# Patient Record
Sex: FEMALE | Race: WHITE
[De-identification: names, ages, dates, MRNs, and addresses within clinical notes are randomized per-mention and may not be internally consistent; named-entity substitution may affect disease eponyms.]

---

## 2017-11-17 ENCOUNTER — HOSPITAL ENCOUNTER (OUTPATIENT)
Dept: HOSPITAL 53 - M LAB REF | Age: 22
End: 2017-11-17
Attending: PHYSICIAN ASSISTANT
Payer: COMMERCIAL

## 2017-11-17 DIAGNOSIS — Z20.2: Primary | ICD-10-CM

## 2017-11-30 ENCOUNTER — HOSPITAL ENCOUNTER (OUTPATIENT)
Dept: HOSPITAL 53 - M LAB | Age: 22
End: 2017-11-30
Attending: ADVANCED PRACTICE MIDWIFE
Payer: COMMERCIAL

## 2017-11-30 DIAGNOSIS — O20.0: Primary | ICD-10-CM

## 2018-03-20 ENCOUNTER — HOSPITAL ENCOUNTER (EMERGENCY)
Dept: HOSPITAL 53 - M ED | Age: 23
Discharge: HOME | End: 2018-03-20
Payer: COMMERCIAL

## 2018-03-20 ENCOUNTER — HOSPITAL ENCOUNTER (OUTPATIENT)
Dept: HOSPITAL 53 - M LAB REF | Age: 23
End: 2018-03-20
Attending: PHYSICIAN ASSISTANT
Payer: COMMERCIAL

## 2018-03-20 DIAGNOSIS — O99.89: Primary | ICD-10-CM

## 2018-03-20 DIAGNOSIS — Z3A.21: ICD-10-CM

## 2018-03-20 DIAGNOSIS — R35.0: Primary | ICD-10-CM

## 2018-03-20 DIAGNOSIS — R51: ICD-10-CM

## 2018-03-20 DIAGNOSIS — E16.2: ICD-10-CM

## 2018-03-20 LAB
ALBUMIN/GLOBULIN RATIO: 1 (ref 1–1.93)
ALBUMIN: 3.5 GM/DL (ref 3.2–5.2)
ALKALINE PHOSPHATASE: 74 U/L (ref 45–117)
ALT SERPL W P-5'-P-CCNC: 14 U/L (ref 12–78)
ANION GAP: 7 MEQ/L (ref 8–16)
AST SERPL-CCNC: 12 U/L (ref 7–37)
BASO #: 0 10^3/UL (ref 0–0.2)
BASO %: 0.3 % (ref 0–1)
BILIRUB CONJ SERPL-MCNC: 0.1 MG/DL (ref 0–0.2)
BILIRUBIN,TOTAL: 0.2 MG/DL (ref 0.2–1)
BLOOD UREA NITROGEN: 5 MG/DL (ref 7–18)
CALCIUM LEVEL: 8.5 MG/DL (ref 8.5–10.1)
CARBON DIOXIDE LEVEL: 25 MEQ/L (ref 21–32)
CHLORIDE LEVEL: 107 MEQ/L (ref 98–107)
CREATININE FOR GFR: 0.5 MG/DL (ref 0.55–1.3)
EOS #: 0.3 10^3/UL (ref 0–0.5)
EOSINOPHIL NFR BLD AUTO: 2.9 % (ref 0–3)
FREE T4: 1.03 NG/DL (ref 0.78–1.33)
GLUCOSE, FASTING: 68 MG/DL (ref 70–100)
HEMATOCRIT: 40.4 % (ref 36–47)
HEMOGLOBIN: 13.9 G/DL (ref 12–16)
IMMATURE GRANULOCYTE %: 0.4 % (ref 0–3)
LEUKOCYTE ESTERASE UR AUTO RFX: (no result)
LYMPH #: 2 10^3/UL (ref 1.5–6.5)
LYMPH %: 17.1 % (ref 24–44)
MAGNESIUM LEVEL: 2.2 MG/DL (ref 1.8–2.4)
MEAN CORPUSCULAR HEMOGLOBIN: 29 PG (ref 27–33)
MEAN CORPUSCULAR HGB CONC: 34.4 G/DL (ref 32–36.5)
MEAN CORPUSCULAR VOLUME: 84.3 FL (ref 80–96)
MONO #: 0.7 10^3/UL (ref 0–0.8)
MONO %: 6.4 % (ref 0–5)
NEUTROPHILS #: 8.4 10^3/UL (ref 1.8–7.7)
NEUTROPHILS %: 72.9 % (ref 36–66)
NRBC BLD AUTO-RTO: 0 % (ref 0–0)
PLATELET COUNT, AUTOMATED: 245 10^3/UL (ref 150–450)
POTASSIUM SERUM: 4.1 MEQ/L (ref 3.5–5.1)
RED BLOOD COUNT: 4.79 10^6/UL (ref 4–5.4)
RED CELL DISTRIBUTION WIDTH: 14.2 % (ref 11.5–14.5)
SODIUM LEVEL: 139 MEQ/L (ref 136–145)
SPECIFIC GRAVITY UR AUTO RFX: 1.02 (ref 1–1.03)
SQUAM EPITHELIAL CELL UR AURFX: 4 /HPF (ref 0–6)
THYROID STIMULATING HORMONE: 0.53 UIU/ML (ref 0.46–3.98)
TOTAL PROTEIN: 7 GM/DL (ref 6.4–8.2)
WHITE BLOOD COUNT: 11.5 10^3/UL (ref 4–10)

## 2018-03-20 RX ADMIN — ACETAMINOPHEN 1 MG: 325 TABLET ORAL at 17:32

## 2018-03-20 RX ADMIN — SODIUM CHLORIDE 1 MLS/HR: 9 INJECTION, SOLUTION INTRAVENOUS at 17:32

## 2018-03-20 RX ADMIN — METOCLOPRAMIDE 1 MG: 5 INJECTION, SOLUTION INTRAMUSCULAR; INTRAVENOUS at 17:32

## 2019-07-26 ENCOUNTER — HOSPITAL ENCOUNTER (OUTPATIENT)
Dept: HOSPITAL 53 - M LAB REF | Age: 24
End: 2019-07-26
Attending: ADVANCED PRACTICE MIDWIFE
Payer: COMMERCIAL

## 2019-07-26 DIAGNOSIS — Z32.01: Primary | ICD-10-CM

## 2019-07-26 LAB
B-HCG SERPL-ACNC: (no result) MIU/ML
HCT VFR BLD AUTO: 42 % (ref 36–47)
HCV AB SER QL: 0.2 INDEX (ref ?–0.8)
HGB BLD-MCNC: 14.4 G/DL (ref 12–15.5)
HIV 1+2 AB+HIV1 P24 AG SERPL QL IA: NEGATIVE
MCH RBC QN AUTO: 29.7 PG (ref 27–33)
MCHC RBC AUTO-ENTMCNC: 34.3 G/DL (ref 32–36.5)
MCV RBC AUTO: 86.6 FL (ref 80–96)
PLATELET # BLD AUTO: 266 10^3/UL (ref 150–450)
RBC # BLD AUTO: 4.85 10^6/UL (ref 4–5.4)
RUBELLA IGG QUALITATIVE: (no result)
WBC # BLD AUTO: 8.8 10^3/UL (ref 4–10)

## 2019-11-04 ENCOUNTER — HOSPITAL ENCOUNTER (EMERGENCY)
Dept: HOSPITAL 19 - COL.ER | Age: 24
Discharge: HOME | End: 2019-11-04
Payer: OTHER GOVERNMENT

## 2019-11-04 VITALS — SYSTOLIC BLOOD PRESSURE: 104 MMHG | DIASTOLIC BLOOD PRESSURE: 60 MMHG | HEART RATE: 68 BPM

## 2019-11-04 VITALS — TEMPERATURE: 98.2 F

## 2019-11-04 VITALS — HEIGHT: 64.02 IN | BODY MASS INDEX: 22.24 KG/M2 | WEIGHT: 130.29 LBS

## 2019-11-04 DIAGNOSIS — Z3A.21: ICD-10-CM

## 2019-11-04 DIAGNOSIS — B96.89: ICD-10-CM

## 2019-11-04 DIAGNOSIS — O23.592: Primary | ICD-10-CM

## 2019-11-04 LAB
PH UR STRIP.AUTO: 7 [PH] (ref 5–8)
RBC # UR: (no result) /HPF
SP GR UR STRIP.AUTO: 1.01 (ref 1–1.03)
SQUAMOUS # URNS: (no result) /HPF
URN COLLECT METHOD CLASS: (no result)

## 2020-01-23 ENCOUNTER — HOSPITAL ENCOUNTER (OUTPATIENT)
Dept: HOSPITAL 19 - LDRO | Age: 25
Discharge: HOME | End: 2020-01-23
Attending: OBSTETRICS & GYNECOLOGY
Payer: OTHER GOVERNMENT

## 2020-01-23 VITALS — WEIGHT: 156.31 LBS | BODY MASS INDEX: 26.69 KG/M2 | HEIGHT: 64.02 IN

## 2020-01-23 VITALS — SYSTOLIC BLOOD PRESSURE: 114 MMHG | DIASTOLIC BLOOD PRESSURE: 66 MMHG | TEMPERATURE: 97.5 F | HEART RATE: 96 BPM

## 2020-01-23 DIAGNOSIS — Z3A.34: ICD-10-CM

## 2020-01-23 DIAGNOSIS — O36.8130: Primary | ICD-10-CM

## 2020-01-23 NOTE — NUR
Pt and spouse arrive ambulatory to unit at 1630.  Pt states she has not felt
baby move in 2.5-3 hours, denies leaking of fluid, contractions, vaginal
bleeding.  States she is having some cramping.  EFM explained and placed.  Pt
positioned for comfort.

## 2020-01-23 NOTE — NUR
1655 - Notified Dr. Pate, see physician notification.
 
1710 - Pt discharged with discomforts of pregnancy handouts and instructions
to return if vaginal bleeding, contractions, leaking of fluid, or decreased
fetal movement.  Pt stated understaning, ambulated independently off unit with
spouse.

## 2020-03-08 ENCOUNTER — HOSPITAL ENCOUNTER (INPATIENT)
Dept: HOSPITAL 19 - LDRO | Age: 25
LOS: 2 days | Discharge: HOME | End: 2020-03-10
Attending: OBSTETRICS & GYNECOLOGY | Admitting: OBSTETRICS & GYNECOLOGY
Payer: OTHER GOVERNMENT

## 2020-03-08 VITALS — HEART RATE: 102 BPM | DIASTOLIC BLOOD PRESSURE: 81 MMHG | SYSTOLIC BLOOD PRESSURE: 132 MMHG | TEMPERATURE: 98.7 F

## 2020-03-08 VITALS — DIASTOLIC BLOOD PRESSURE: 56 MMHG | TEMPERATURE: 98.9 F | SYSTOLIC BLOOD PRESSURE: 99 MMHG | HEART RATE: 67 BPM

## 2020-03-08 VITALS — SYSTOLIC BLOOD PRESSURE: 131 MMHG | DIASTOLIC BLOOD PRESSURE: 85 MMHG | HEART RATE: 95 BPM

## 2020-03-08 VITALS — HEART RATE: 98 BPM | DIASTOLIC BLOOD PRESSURE: 66 MMHG | SYSTOLIC BLOOD PRESSURE: 116 MMHG | TEMPERATURE: 98.7 F

## 2020-03-08 VITALS — HEART RATE: 92 BPM | DIASTOLIC BLOOD PRESSURE: 76 MMHG | SYSTOLIC BLOOD PRESSURE: 132 MMHG

## 2020-03-08 VITALS — HEART RATE: 105 BPM | DIASTOLIC BLOOD PRESSURE: 56 MMHG | SYSTOLIC BLOOD PRESSURE: 123 MMHG

## 2020-03-08 VITALS — TEMPERATURE: 98.2 F | HEART RATE: 112 BPM | SYSTOLIC BLOOD PRESSURE: 113 MMHG | DIASTOLIC BLOOD PRESSURE: 74 MMHG

## 2020-03-08 VITALS — HEART RATE: 84 BPM | DIASTOLIC BLOOD PRESSURE: 64 MMHG | SYSTOLIC BLOOD PRESSURE: 120 MMHG

## 2020-03-08 VITALS — HEART RATE: 85 BPM | SYSTOLIC BLOOD PRESSURE: 142 MMHG | DIASTOLIC BLOOD PRESSURE: 75 MMHG

## 2020-03-08 VITALS — DIASTOLIC BLOOD PRESSURE: 61 MMHG | HEART RATE: 71 BPM | SYSTOLIC BLOOD PRESSURE: 111 MMHG

## 2020-03-08 VITALS — SYSTOLIC BLOOD PRESSURE: 113 MMHG | HEART RATE: 85 BPM | DIASTOLIC BLOOD PRESSURE: 66 MMHG

## 2020-03-08 VITALS — DIASTOLIC BLOOD PRESSURE: 59 MMHG | SYSTOLIC BLOOD PRESSURE: 116 MMHG | HEART RATE: 81 BPM

## 2020-03-08 VITALS — DIASTOLIC BLOOD PRESSURE: 70 MMHG | SYSTOLIC BLOOD PRESSURE: 120 MMHG | HEART RATE: 108 BPM

## 2020-03-08 VITALS — DIASTOLIC BLOOD PRESSURE: 66 MMHG | TEMPERATURE: 99.2 F | SYSTOLIC BLOOD PRESSURE: 110 MMHG | HEART RATE: 101 BPM

## 2020-03-08 VITALS — DIASTOLIC BLOOD PRESSURE: 87 MMHG | SYSTOLIC BLOOD PRESSURE: 128 MMHG | HEART RATE: 117 BPM

## 2020-03-08 VITALS — DIASTOLIC BLOOD PRESSURE: 85 MMHG | TEMPERATURE: 98.4 F | HEART RATE: 97 BPM | SYSTOLIC BLOOD PRESSURE: 136 MMHG

## 2020-03-08 VITALS — HEART RATE: 76 BPM | SYSTOLIC BLOOD PRESSURE: 109 MMHG | DIASTOLIC BLOOD PRESSURE: 61 MMHG

## 2020-03-08 VITALS — TEMPERATURE: 97.7 F | HEART RATE: 90 BPM | SYSTOLIC BLOOD PRESSURE: 120 MMHG | DIASTOLIC BLOOD PRESSURE: 78 MMHG

## 2020-03-08 VITALS — DIASTOLIC BLOOD PRESSURE: 69 MMHG | SYSTOLIC BLOOD PRESSURE: 104 MMHG | HEART RATE: 125 BPM

## 2020-03-08 VITALS — DIASTOLIC BLOOD PRESSURE: 85 MMHG | HEART RATE: 103 BPM | SYSTOLIC BLOOD PRESSURE: 130 MMHG

## 2020-03-08 VITALS — HEART RATE: 82 BPM | DIASTOLIC BLOOD PRESSURE: 72 MMHG | SYSTOLIC BLOOD PRESSURE: 125 MMHG

## 2020-03-08 VITALS — HEART RATE: 100 BPM | SYSTOLIC BLOOD PRESSURE: 119 MMHG | DIASTOLIC BLOOD PRESSURE: 73 MMHG

## 2020-03-08 VITALS — SYSTOLIC BLOOD PRESSURE: 117 MMHG | HEART RATE: 137 BPM | DIASTOLIC BLOOD PRESSURE: 77 MMHG

## 2020-03-08 VITALS — SYSTOLIC BLOOD PRESSURE: 136 MMHG | HEART RATE: 104 BPM | DIASTOLIC BLOOD PRESSURE: 86 MMHG

## 2020-03-08 VITALS — HEART RATE: 91 BPM | DIASTOLIC BLOOD PRESSURE: 78 MMHG | SYSTOLIC BLOOD PRESSURE: 123 MMHG

## 2020-03-08 VITALS — BODY MASS INDEX: 28.23 KG/M2 | HEIGHT: 64.02 IN | WEIGHT: 165.35 LBS

## 2020-03-08 VITALS — DIASTOLIC BLOOD PRESSURE: 64 MMHG | SYSTOLIC BLOOD PRESSURE: 105 MMHG | HEART RATE: 73 BPM

## 2020-03-08 VITALS — HEART RATE: 93 BPM | SYSTOLIC BLOOD PRESSURE: 134 MMHG | DIASTOLIC BLOOD PRESSURE: 86 MMHG

## 2020-03-08 VITALS — SYSTOLIC BLOOD PRESSURE: 138 MMHG | HEART RATE: 139 BPM | DIASTOLIC BLOOD PRESSURE: 69 MMHG

## 2020-03-08 VITALS — SYSTOLIC BLOOD PRESSURE: 125 MMHG | DIASTOLIC BLOOD PRESSURE: 72 MMHG | HEART RATE: 96 BPM

## 2020-03-08 VITALS — DIASTOLIC BLOOD PRESSURE: 59 MMHG | HEART RATE: 78 BPM | SYSTOLIC BLOOD PRESSURE: 115 MMHG

## 2020-03-08 VITALS — SYSTOLIC BLOOD PRESSURE: 119 MMHG | HEART RATE: 99 BPM | DIASTOLIC BLOOD PRESSURE: 78 MMHG

## 2020-03-08 VITALS — DIASTOLIC BLOOD PRESSURE: 76 MMHG | HEART RATE: 100 BPM | SYSTOLIC BLOOD PRESSURE: 121 MMHG

## 2020-03-08 VITALS — HEART RATE: 97 BPM | SYSTOLIC BLOOD PRESSURE: 118 MMHG | DIASTOLIC BLOOD PRESSURE: 75 MMHG

## 2020-03-08 VITALS — SYSTOLIC BLOOD PRESSURE: 114 MMHG | HEART RATE: 72 BPM | TEMPERATURE: 98 F | DIASTOLIC BLOOD PRESSURE: 60 MMHG

## 2020-03-08 VITALS — SYSTOLIC BLOOD PRESSURE: 122 MMHG | DIASTOLIC BLOOD PRESSURE: 76 MMHG | HEART RATE: 97 BPM

## 2020-03-08 DIAGNOSIS — Z3A.39: ICD-10-CM

## 2020-03-08 LAB
BASOPHILS # BLD: 0.1 10*3/UL (ref 0–0.2)
BASOPHILS NFR BLD AUTO: 0.4 % (ref 0–2)
EOSINOPHIL # BLD: 0.1 10*3/UL (ref 0–0.7)
EOSINOPHIL NFR BLD: 0.4 % (ref 0–4)
ERYTHROCYTE [DISTWIDTH] IN BLOOD BY AUTOMATED COUNT: 14.6 % (ref 11.5–14.5)
GRANULOCYTES # BLD AUTO: 77.6 % (ref 42.2–75.2)
HCT VFR BLD AUTO: 37.9 % (ref 37–47)
HGB BLD-MCNC: 12.3 G/DL (ref 12.5–16)
LYMPHOCYTES # BLD: 1.9 10*3/UL (ref 1.2–3.4)
LYMPHOCYTES NFR BLD: 14.3 % (ref 20–51)
MCH RBC QN AUTO: 26 PG (ref 27–31)
MCHC RBC AUTO-ENTMCNC: 33 G/DL (ref 33–37)
MCV RBC AUTO: 81 FL (ref 80–100)
MONOCYTES # BLD: 0.8 10*3/UL (ref 0.1–0.6)
MONOCYTES NFR BLD AUTO: 6.3 % (ref 1.7–9.3)
NEUTROPHILS # BLD: 10.4 10*3/UL (ref 1.4–6.5)
PLATELET # BLD AUTO: 223 K/MM3 (ref 130–400)
PMV BLD AUTO: 11.5 FL (ref 7.4–10.4)
RBC # BLD AUTO: 4.69 M/MM3 (ref 4.1–5.3)

## 2020-03-08 PROCEDURE — 0KQM0ZZ REPAIR PERINEUM MUSCLE, OPEN APPROACH: ICD-10-PCS | Performed by: OBSTETRICS & GYNECOLOGY

## 2020-03-08 PROCEDURE — 10907ZC DRAINAGE OF AMNIOTIC FLUID, THERAPEUTIC FROM PRODUCTS OF CONCEPTION, VIA NATURAL OR ARTIFICIAL OPENING: ICD-10-PCS | Performed by: OBSTETRICS & GYNECOLOGY

## 2020-03-08 NOTE — NUR
Patient sitting up, epidural catheter removed, blue tip intact and band aid to
site. Patient able to raise both legs off beg but states "left leg and thigh
feel heavy." Patient pivots into wheelchair with assist times 2. Patient
pivots onto toilet with assist times 2. Patient unable to void at this time
but feels the urge. Pericare provided, ice pack and tucks pads used. New gown
on. Patient pivots into wheelchair taken to room 207 and resting in bed.
Educated patient to call out for help to bathroom. Patient verbalizes
understanding.

## 2020-03-08 NOTE — NUR
2022: Spontaneous vaginal delivery of infant head, immediately followed by
infant body. Infant to mothers abdomen where nose and mouth suctioned by Dr. Pate. Pitocin off. Infant umbilical cord clamped by Dr. Pate and cut by
father of baby.
2025: Spontaneous and intact delivery of placenta. Fundus massaged and firms
with massage. Pitocin restarted at 333ml/hr. Second degree perineal laceration
repaired. Uterine atony noted and methergine given IM at 2032. Fundus massaged
and firms with massage. EBL 400ml. Pericare provided, ice pack to perineum and
patient sitting up in bed. Apgars 8/9/9. See labor and delivery summary,
doctor dictation and anesthesia records.

## 2020-03-08 NOTE — NUR
Pt requesting epidural. Pt ambulating in halls at this time. Toño COPPOLA
called and notified. Will be here at 1500.

## 2020-03-08 NOTE — NUR
After Hospital Care Plan:    Discharge Instructions Knee Replacement-Dr. Barber Iyer    Patient Name  Albina Babinski  Date of procedure  8/6/2018    Procedure  Procedure(s):  RIGHT TOTAL KNEE ARTHROPLASTY  (CHOICE ANESTH)  Surgeon  Surgeon(s) and Role:     * Art Dutta MD - Primary  Date of discharge: No discharge date for patient encounter. PCP: Chevy Henry MD    Follow up appointments  -follow up with Dr. Barber Iyer in 2 weeks. Call [phone] to make an appointment. [facility] Agency: _________________________   phone: ___________________  The agency will contact you to arrange dates/times for visits. Please call them if you do not hear from them within 24 hours after you are discharged. When to call your Orthopaedic Surgeon:  -unrelieved pain  -Signs of infection-if your incision is red; continues to have drainage; drainage has a foul odor or if you have a persistent fever over 101 degrees  -Signs of a blood clot in your leg-calf pain, tenderness, redness, swelling of lower leg  When to call your Primary Care Physician:  -Concerns about medical conditions such as diabetes, high blood pressure, asthma, congestive heart failure  -Call if blood sugars are elevated, persistent headache or dizziness, coughing or congestion, constipation or diarrhea, burning with urination, abnormal heart rate  When to call 911and go to the nearest emergency room  -acute onset of chest pain, shortness of breath, difficulty breathing    Activity  -weight bearing as tolerated with your walker or crutches. Refer to pages 23-31 of your handbook for instructions and pictures.   -20 repetitions of each exercise as instructed by therapist 3 times each day.   Refer to pages 32-40 of your handbook for exercise instructions and pictures  -get up every one hour and walk (except at night when sleeping)  -do not drive or operate heavy machinery    Incision Care  -you will have staples in your knee incision; they will be removed at Pt here with c/o contractions that started this morning around 0900 and have
been every 4 minutes.
G2L0 39.1 weeks gestation. Pt to EFM, explained.
VSS, intital fetal baseline 155bpm.
SVE:3/50/-2.
Assessment complete, pt with hx of 21 week TAB for Trisomy 13, GBS negative.
Pt states has had some decreased fetal movement. Denies any vaginal bleeding
or leaking of fluid.
Contractions every 3-4 minutes, breathing through them and palpate firm.
1125:Dr Pate called and updated, will recheck in one hour and call physician
back. the surgeons office visit in 2 weeks  -Keep a dressing (ABD and paper tape) on your incision and change daily. Wash your hands thoroughly before changing the dressing. Once you incision is not draining, you may leave it open to air  -You may shower and get your incision wet but do not submerge your incision under water in a bath tub, hot tub or swimming pool for 6 weeks after surgery. Preventing blood clots  -take Xarelto at 12 noon daily as prescribed by Dr. Angelique Odonnell    Pain management  -take pain medication as prescribed; decrease the amount you use as your pain lessens  -avoid alcoholic beverages while taking pain medication  -Please be aware that many medications contain Tylenol. We do not want you to over medicate so please read the information below as a guide. Do not take more than 4 Grams of Tylenol in a 24 hour period. (There are 1000 milligrams in one Gram)    -You may place an ice bag on your knee for 15-20 minutes after exercising    Diet  -resume usual diet; drink plenty of fluids; eat foods high in fiber  -you may want to take a stool softener (such as Senokot-S or Colace) to prevent constipation while you are taking pain medication. If constipation occurs, take a laxative (such as Dulcolax tablets, Milk of Magnesia, or a suppository)    [facility] Protocol [facility]  Nursing-per physicians order  -complete head to toe assessment, vital signs  -staples will be removed in the surgeons office at 2 week visit  -medication reconciliation  -review pain management  -manage chronic medical conditions    Physical Therapy-per physicians order  Weight bearing status:  Precautions at Admission: WBAT, Fall  Left Side Weight Bearing: Full  Right Side Weight Bearing: As tolerated  ?   Mobility Status:  Supine to Sit: Modified independent, Adaptive equipment, Additional time (using strap to assist RLE)  Sit to Stand: Supervision, Adaptive equipment, Additional time  Sit to Supine: Modified independent, Adaptive equipment, Additional time (using strap to assist RLE)     Gait:  Distance (ft): 120 Feet (ft)  Ambulation - Level of Assistance: Contact guard assistance, Adaptive equipment, Additional time  Assistive Device: Gait belt, Walker, rolling  Gait Abnormalities: Antalgic, Decreased step clearance, Step to gait (partial step through with practice)  ADL status overall composite:                -Home Therapy  -assessment and evaluation-bed mobility; functional transfers (bed, chair, bathroom, stairs); ambulation with equipment, car transfers, shower transfers, safety and ability to get out of house in the event of an emergency  -review weight bearing as tolerated, wean from walker or crutches as tolerated  -discuss pain management  -review how to do ADLs    -Home Exercise Program-refer to [id number] of the patient handbook for exercises  -supine exercises-ankle pumps, hamstring sets, quad sets, heel slides, short arc quad sets, long arc quads-prop heel on pillow for stretch, straight leg raise-sitting exercises-active knee flexion, passive knee flexion, active knee extension, ankle pumps, bicep curls (use weights as appropriate), triceps pushups, shoulder flexion (use weights as appropriate)  -standing exercises holding onto supportive counter-toe raises, heel raises, mini-squats, heel/toe touches with knee bend, marching in place, hamstring curls

## 2020-03-08 NOTE — NUR
SVE: 3+/80/-1 and bulgy bag of arias and bloody show noted. Dr Pate called
and updated. Will admit and come in to [facility].
1230:IV started to left wrist, blood drawn from site and then flushes well.
1245:Dr Pate here and at beside. Reviews monitor strip.
SVE: 3/80/-1, AROM, clear fluid noted.
1305:Dr Pate ok with pt getting up to ambulate in halls and will recheck at
1245.

## 2020-03-08 NOTE — NUR
Toño CRNA here and at bedside. Pt sitting up on side of bed. Epidural
placed and single shot at 1510. Pt tolerated well. After epidural pt
repositioned.
1515:Pitocin started at 2mu per order.
1530:Pt pale and states her "ears are roaring" /56.
1535:Ephderine 10mg given IV and IVF bolus continues.
1545:Pt repositioned to left side.
SVE: 5/90/0. Simms catheter placed.

## 2020-03-08 NOTE — NUR
1920: Dr. Pate at nurses station and review FHR and contraction pattern. To
room to assess patient. SVE 9/90/0 per Dr. Pate. Per Dr. Pate order increase
the pitocin to 18mu/min and position patient right lateral with peanut ball.
Patient repositioned and resting comfortably.

## 2020-03-08 NOTE — NUR
1950: Dr. Pate at bedside and E 10/100/+2. Patient prepped for delivery and
educated on pushing technique.
Simms catheter removed. Patient begins pushing with contractions at 1950.

## 2020-03-09 VITALS — DIASTOLIC BLOOD PRESSURE: 67 MMHG | TEMPERATURE: 98 F | SYSTOLIC BLOOD PRESSURE: 111 MMHG | HEART RATE: 83 BPM

## 2020-03-09 VITALS — SYSTOLIC BLOOD PRESSURE: 114 MMHG | HEART RATE: 84 BPM | DIASTOLIC BLOOD PRESSURE: 67 MMHG | TEMPERATURE: 98.3 F

## 2020-03-09 VITALS — SYSTOLIC BLOOD PRESSURE: 108 MMHG | TEMPERATURE: 97.1 F | DIASTOLIC BLOOD PRESSURE: 59 MMHG | HEART RATE: 88 BPM

## 2020-03-09 VITALS — SYSTOLIC BLOOD PRESSURE: 95 MMHG | TEMPERATURE: 97.6 F | DIASTOLIC BLOOD PRESSURE: 48 MMHG | HEART RATE: 91 BPM

## 2020-03-09 VITALS — DIASTOLIC BLOOD PRESSURE: 71 MMHG | SYSTOLIC BLOOD PRESSURE: 109 MMHG | HEART RATE: 81 BPM | TEMPERATURE: 96.3 F

## 2020-03-09 VITALS — TEMPERATURE: 98 F | HEART RATE: 83 BPM | DIASTOLIC BLOOD PRESSURE: 59 MMHG | SYSTOLIC BLOOD PRESSURE: 102 MMHG

## 2020-03-09 NOTE — NUR
Iniial visit; Patient thanked  for offering congratulations and God's
blessings for the birth of her daughter.  thanked patient for choosing
[facility].

## 2020-03-10 VITALS — SYSTOLIC BLOOD PRESSURE: 111 MMHG | TEMPERATURE: 98.6 F | HEART RATE: 69 BPM | DIASTOLIC BLOOD PRESSURE: 60 MMHG

## 2020-03-10 NOTE — NUR
Patient given discharge instructions. Denies questions. Infant and mother
wristbands verified, patient to border status in room 207.

## 2021-01-16 ENCOUNTER — HOSPITAL ENCOUNTER (EMERGENCY)
Dept: HOSPITAL 53 - M ED | Age: 26
LOS: 1 days | Discharge: HOME | End: 2021-01-17
Payer: SELF-PAY

## 2021-01-16 VITALS — SYSTOLIC BLOOD PRESSURE: 119 MMHG | DIASTOLIC BLOOD PRESSURE: 58 MMHG

## 2021-01-16 VITALS — HEIGHT: 64 IN | WEIGHT: 128.53 LBS | BODY MASS INDEX: 21.94 KG/M2

## 2021-01-16 DIAGNOSIS — S37.019A: Primary | ICD-10-CM
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
## 2021-01-16 DIAGNOSIS — R31.9: ICD-10-CM
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
## 2021-01-16 LAB
ALBUMIN SERPL BCG-MCNC: 4.1 GM/DL (ref 3.2–5.2)
ALT SERPL W P-5'-P-CCNC: 23 U/L (ref 12–78)
BASOPHILS # BLD AUTO: 0 10^3/UL (ref 0–0.2)
BASOPHILS NFR BLD AUTO: 0.3 % (ref 0–1)
BILIRUB CONJ SERPL-MCNC: < 0.1 MG/DL (ref 0–0.2)
BILIRUB SERPL-MCNC: 0.3 MG/DL (ref 0.2–1)
BUN SERPL-MCNC: 15 MG/DL (ref 7–18)
CALCIUM SERPL-MCNC: 9.1 MG/DL (ref 8.5–10.1)
CHLORIDE SERPL-SCNC: 109 MEQ/L (ref 98–107)
CO2 SERPL-SCNC: 19 MEQ/L (ref 21–32)
CREAT SERPL-MCNC: 0.74 MG/DL (ref 0.55–1.3)
EOSINOPHIL # BLD AUTO: 0.1 10^3/UL (ref 0–0.5)
EOSINOPHIL NFR BLD AUTO: 0.8 % (ref 0–3)
GFR SERPL CREATININE-BSD FRML MDRD: > 60 ML/MIN/{1.73_M2} (ref 60–?)
GLUCOSE SERPL-MCNC: 76 MG/DL (ref 70–100)
HCT VFR BLD AUTO: 41.1 % (ref 36–47)
HGB BLD-MCNC: 13.9 G/DL (ref 12–15.5)
LIPASE SERPL-CCNC: 136 U/L (ref 73–393)
LYMPHOCYTES # BLD AUTO: 3 10^3/UL (ref 1.5–5)
LYMPHOCYTES NFR BLD AUTO: 29.5 % (ref 24–44)
MCH RBC QN AUTO: 28.4 PG (ref 27–33)
MCHC RBC AUTO-ENTMCNC: 33.8 G/DL (ref 32–36.5)
MCV RBC AUTO: 84 FL (ref 80–96)
MONOCYTES # BLD AUTO: 0.8 10^3/UL (ref 0–0.8)
MONOCYTES NFR BLD AUTO: 7.4 % (ref 0–5)
NEUTROPHILS # BLD AUTO: 6.3 10^3/UL (ref 1.5–8.5)
NEUTROPHILS NFR BLD AUTO: 61.7 % (ref 36–66)
PLATELET # BLD AUTO: 277 10^3/UL (ref 150–450)
POTASSIUM SERPL-SCNC: 3.5 MEQ/L (ref 3.5–5.1)
PROT SERPL-MCNC: 7.3 GM/DL (ref 6.4–8.2)
RBC # BLD AUTO: 4.89 10^6/UL (ref 4–5.4)
SODIUM SERPL-SCNC: 139 MEQ/L (ref 136–145)
WBC # BLD AUTO: 10.3 10^3/UL (ref 4–10)

## 2021-01-16 PROCEDURE — 86850 RBC ANTIBODY SCREEN: CPT

## 2021-01-16 PROCEDURE — 81001 URINALYSIS AUTO W/SCOPE: CPT

## 2021-01-16 PROCEDURE — 96374 THER/PROPH/DIAG INJ IV PUSH: CPT

## 2021-01-16 PROCEDURE — 85025 COMPLETE CBC W/AUTO DIFF WBC: CPT

## 2021-01-16 PROCEDURE — 80076 HEPATIC FUNCTION PANEL: CPT

## 2021-01-16 PROCEDURE — 80048 BASIC METABOLIC PNL TOTAL CA: CPT

## 2021-01-16 PROCEDURE — 86901 BLOOD TYPING SEROLOGIC RH(D): CPT

## 2021-01-16 PROCEDURE — 99284 EMERGENCY DEPT VISIT MOD MDM: CPT

## 2021-01-16 PROCEDURE — 96361 HYDRATE IV INFUSION ADD-ON: CPT

## 2021-01-16 PROCEDURE — 83690 ASSAY OF LIPASE: CPT

## 2021-01-16 PROCEDURE — 74177 CT ABD & PELVIS W/CONTRAST: CPT

## 2021-01-16 PROCEDURE — 86900 BLOOD TYPING SEROLOGIC ABO: CPT

## 2021-01-16 NOTE — CCD
Summarization Of Episode

                             Created on: 2021



TRINA TRINIDAD

External Reference #: [id number]

: 1995

Sex: Female



Demographics





                          Address                   [address] [place] [address]

 

                          Home Phone                [phone]

 

                          Preferred Language        English

 

                          Marital Status            Single

 

                          Quaker Affiliation     NONE

 

                          Race                      White

 

                          Ethnic Group              Not  or 





Author





                          Author                    [facility]

 

                          Organization              [facility]

 

                          Address                   Unknown

 

                          Phone                     Unavailable







Support





                Name            Relationship    Address         Phone

 

                    HUGO HECK    Next Of Kin         [address] [place]  [address]                  [phone]

 

                    Kristofer SCOTNildaSandra Next Of Kin         [address] [place] [address]                    [phone]

 

                    VALERIA FLORES      Next Of Kin         [address] [place]  [address]               [phone]

 

                    CONVERG           Next Of Kin         [address] [place] [address]                    [phone]

 

                CARMEL GREENWOOD Next Of Kin     Unknown         [phone]

 

                    STREAM              Next Of Kin         [address] [place] [address]                    [phone]

 

                              Next Of Kin     Unknown         Unavailable

 

                UE              Next Of Kin     Unknown         Unavailable

 

                    CHARLEY PALAFOX   Next Of Kin         [address] [place]  [address]                    [phone]

 

                    VALERIA PALAFOX Next Of Kin         [address] [place]  [address]               [phone]







Care Team Providers





                    Care Team Member Name Role                Phone

 

                    Sandra Miner SCOT FNP Unavailable         Unavailable



                                  



Re-disclosure Warning

          The records that you are about to access may contain information from 
federally-assisted alcohol or drug abuse programs. If such information is 
present, then the following federally mandated warning applies: This information
has been disclosed to you from records protected by federal confidentiality 
rules (42 CFR part 2). The federal rules prohibit you from making any further 
disclosure of this information unless further disclosure is expressly permitted 
by the written consent of the person to whom it pertains or as otherwise 
permitted by 42 CFR part 2. A general authorization for the release of medical 
or other information is NOT sufficient for this purpose. The Federal rules 
restrict any use of the information to criminally investigate or prosecute any 
alcohol or drug abuse patient.The records that you are about to access may 
contain highly sensitive health information, the redisclosure of which is 
protected by Article 27-F of the [facility] Public Health law. If you 
continue you may have access to information: Regarding HIV / AIDS; Provided by 
facilities licensed or operated by the [facility] Office of Mental Health; 
or Provided by the [facility] Office for People With Developmental 
Disabilities. If such information is present, then the following [place] 
mandated warning applies: This information has been disclosed to you from 
confidential records which are protected by state law. State law prohibits you 
from making any further disclosure of this information without the specific 
written consent of the person to whom it pertains, or as otherwise permitted by 
law. Any unauthorized further disclosure in violation of state law may result in
a fine or care home sentence or both. A general authorization for the release of 
medical or other information is NOT sufficient authorization for further disc
losure.                                                                         
    



Family History

          



             Family Member Name Family Member Gender Family Member Status Date o

f Status 

Description                             Data Source(s)

 

           Unknown    Unknown    Problem                          [facility]



                                                                                
       



Encounters

          



           Encounter  Providers  Location   Date       Indications Data Source(s

)

 

           Outpatient Attender: SCOT ELLISON          2020 07:55:56 P

M EDT            [facility]



                                                                    



Insurance Providers

          



             Payer name   Policy type / Coverage type Policy ID    Covered party

 ID Covered 

party's relationship to alcantar Policy Alcantar             Plan Information

 

          [facility]           [id number]           SP           

       [id number]

 

          [facility]           [id number]           18             

     [id number]

 

          [facility]           [id number]           18             

     [id number]

 

          [facility]       U         [id number]           Self                [id number]

 

             U         [id number]           Child               [id number]

[id number]

 

          [facility]           [id number]           SP           

       [id number]

 

          [facility]      -PHYSICIAN           [id number]          

 18                  [id number]

 

          [facility] Commercial [id number]           Self             

   [id number]

 

           [facility] Commercial [id number]           Family Dependent    

       [id number]

 

          [facility]           [id number]           Commercial I

nsurance Co.           

[id number]

 

          [employer]     U         [id number]           Child               [id number]

 

           [employer]            [id number]           FA2        

         [id number]

 

          [employer]               [id number]           MO2                 [id number]

 

           [employer]            [id number]           FA2        

         [id number]

 

           [employer] [id number]           Family Dependent    

       [id number]

 

          [employer]     Commercial [id number]           Family Dependent           89

[id number]

 

            [employer] [place]           [id number]           FA2          

       [id number]

 

           [employer] - O/P           [id number]           19            

      [id number]

 

          [employer]                       -O/P           [id number]           19     

             [id number]

 

           [place] [employer]S [place]     -O/P           [id number]           

19                  [id number]

 

           [employer]S [employer]     -O/P           [id number]         

  19                  [id number]

 

           N REGIONAL CLAIMS FROIL[employer]-PHYSICIAN           [id number]        

   19                  [id number]

 

           [employer] Part B [id number]           Family Dependent   

        [id number]

 

          [employer]     Commercial [id number]           Family Dependent           89

[id number]

 

          [employer]               [id number]           MO2                 [id number]

 

          [employer] NO FAULT           [id number]           MO2           

      [id number]

 

          [employer]               [id number]           MO2                 [id number]

 

          [employer]/ AD O         [id number]           C                  

 [id number]

 

          [employer] PPO O         [id number]           C                   [id number]

 

           [place] S         [id number]           P                  

 [id number]

 

          [employer]     P         [id number]           O                   [id number]

 

          [employer] NO FAULT           [id number]           MO2            

     [id number]

 

          [employer]               [id number]           MO2                 [id number]

 

          [employer] NO FAULT O         [id number]           C              

     [id number]

 

            [employer] [place] O         [id number]           S              

     [id number]

 

          [employer]               [id number]           MO2                 [id number]

 

          [employer] PPO P         [id number]           C                   [id number]

 

                              [id number]                               [id number]

 

                              [id number]                               [id number]

## 2021-01-16 NOTE — REPVR
PROCEDURE INFORMATION: 

Exam: CT Abdomen And Pelvis With Contrast 

Exam date and time: 1/16/2021 10:43 PM 

Age: 25 years old 

Clinical indication: Injury or trauma; Fall; Blunt; Generalized 



TECHNIQUE: 

Imaging protocol: Computed tomography of the abdomen and pelvis with 

intravenous contrast. 

Radiation optimization: All CT scans at this facility use at least one of these 

dose optimization techniques: automated exposure control; mA and/or kV 

adjustment per patient size (includes targeted exams where dose is matched to 

clinical indication); or iterative reconstruction. 

Contrast material: ISOVUE 370; Contrast volume: 100 ml; Contrast route: 

INTRAVENOUS (IV);  



COMPARISON: 

CT ABD PELVIS WITH CONTRAST 2/15/2016 11:50 PM 



FINDINGS: 

Lungs: The imaged portions of the lung bases are clear. The lungs were not 

fully imaged. 

Heart: No cardiomegaly or pericardial effusion. 

Diaphragm: Intact. 



Liver: Intact. No liver lesion is seen. The contour of the liver is smooth. No 

hepatomegaly is noted. 

Gallbladder and bile ducts: The gallbladder is contracted. No calcified 

gallstones are seen. No dilation of the bile ducts is noted. No calcified 

stones are seen in the common bile duct. 

Pancreas: Normal. No dilation of the main pancreatic duct is noted. There is no 

inflammatory fat stranding around the pancreas to suggest acute pancreatitis. 

Spleen: Normal. No splenomegaly is noted. 

Adrenal glands: Normal. No adrenal mass is noted. 

Kidneys and ureters: The kidneys are intact. No renal lesion is identified. No 

stones are noted in the kidneys or ureters. There is no hydronephrosis or 

hydroureter. 

Stomach and bowel: The stomach and small bowel are unremarkable. There is no 

evidence for a bowel obstruction, diverticulosis, diverticulitis, colitis, 

perforated viscus, pneumatosis intestinalis, intussusception, or volvulus. 

Appendix: Normal. There is no evidence for appendicitis. 



Intraperitoneal space: There is a small amount of water density free fluid in 

the cul-de-sac. No free air. No abscess. 

Retroperitoneal space: No retroperitoneal hemorrhage. 

Vasculature: The abdominal aorta is patent, normal in caliber, and there is no 

dissection. The iliac arteries, common femoral arteries, renal arteries, celiac 

artery, superior mesenteric artery, and inferior mesenteric artery are patent. 

Lymph nodes: No enlarged lymph nodes. 

Urinary bladder: The partially distended urinary bladder is unremarkable. No 

stones or masses are seen in the bladder. 

Reproductive: The uterus is anterverted and unremarkable. The ovaries are 

unremarkable. Incidental note is made of a 1.7 cm corpus luteal cyst in the 

left ovary, for which follow-up is not necessary. 

Bones/joints: There is no fracture or dislocation. No suspicious osteolytic or 

osteoblastic lesion. There are degenerative changes involving the lower lumbar 

spine. There is a slight dextroscoliosis at the thoracolumbar junction. 

Soft tissues: There is a tiny fat containing umbilical hernia. 



IMPRESSION: 

No CT evidence for acute traumatic injury in the abdomen or pelvis. 



Electronically signed by: Tony Armijo On 01/16/2021  23:18:09 PM

## 2021-01-16 NOTE — CCD
Summarization Of Episode

                             Created on: 2021



TRINA TRINIDAD

External Reference #: [id number]

: 1995

Sex: Female



Demographics





                          Address                   [address] [place]  [address]

 

                          Home Phone                [phone]

 

                          Preferred Language        English

 

                          Marital Status            Single

 

                          Sabianism Affiliation     NONE

 

                          Race                      White

 

                          Ethnic Group              Not  or 





Author





                          Author                    [facility]

 

                          Organization              [facility]

 

                          Address                   Unknown

 

                          Phone                     Unavailable







Support





                Name            Relationship    Address         Phone

 

                    HUGO HECK    Next Of Kin         [address] [place]  [address]                  [phone]

 

                    Kristofer CASEYSandra WEAVER Next Of Kin         [address] [place]  [address]                    [phone]

 

                    VALERIA FLORES      Next Of Kin         [address] [place]  [address]               [phone]

 

                    CONVERG           Next Of Kin         [address] [place] [address]                    [phone]

 

                CARMEL GREENWOOD Next Of Kin     Unknown         [phone]

 

                    STREAM              Next Of Kin         [address] [place] [address]                    [phone]

 

                              Next Of Kin     Unknown         Unavailable

 

                UE              Next Of Kin     Unknown         Unavailable

 

                    CHARLEY PALAFOX   Next Of Kin         [address] [place]  [address]                    [phone]

 

                    VALERIA PALAFOX Next Of Kin         [address] [place]  [address]               [phone]







Care Team Providers





                    Care Team Member Name Role                Phone

 

                    Sandra Miner [facility] FNP Unavailable         Unavailable



                                  



Re-disclosure Warning

          The records that you are about to access may contain information from 
federally-assisted alcohol or drug abuse programs. If such information is 
present, then the following federally mandated warning applies: This information
has been disclosed to you from records protected by federal confidentiality 
rules (42 CFR part 2). The federal rules prohibit you from making any further 
disclosure of this information unless further disclosure is expressly permitted 
by the written consent of the person to whom it pertains or as otherwise 
permitted by 42 CFR part 2. A general authorization for the release of medical 
or other information is NOT sufficient for this purpose. The Federal rules 
restrict any use of the information to criminally investigate or prosecute any 
alcohol or drug abuse patient.The records that you are about to access may 
contain highly sensitive health information, the redisclosure of which is 
protected by Article 27-F of the [facility] Public Health law. If you 
continue you may have access to information: Regarding HIV / AIDS; Provided by 
facilities licensed or operated by the [facility] Office of Mental Health; 
or Provided by the [facility] Office for People With Developmental 
Disabilities. If such information is present, then the following [place] 
mandated warning applies: This information has been disclosed to you from 
confidential records which are protected by state law. State law prohibits you 
from making any further disclosure of this information without the specific 
written consent of the person to whom it pertains, or as otherwise permitted by 
law. Any unauthorized further disclosure in violation of state law may result in
a fine or correction sentence or both. A general authorization for the release of 
medical or other information is NOT sufficient authorization for further disc
losure.                                                                         
    



Family History

          



             Family Member Name Family Member Gender Family Member Status Date o

f Status 

Description                             Data Source(s)

 

           Unknown    Unknown    Problem                          [facility]



                                                                                
       



Encounters

          



           Encounter  Providers  Location   Date       Indications Data Source(s

)

 

           Outpatient Attender: SCOT ELLISON          2020 07:55:56 P

M EDT            [facility]



                                                                    



Insurance Providers

          



             Payer name   Policy type / Coverage type Policy ID    Covered party

 ID Covered 

party's relationship to alcantar Policy Alcantar             Plan Information

 

          [facility]           [id number]           SP           

       [id number]

 

          [facility]           [id number]           18             

     [id number]

 

          [facility] AT [facility]           [id number]           18             

     [id number]

 

          [facility]       U         [id number]           Self                [id number]

 

             U         [id number]           Child               [id number]

[id number]

 

          [facility]           [id number]           SP           

       [id number]

 

          [facility] AT [facility]      -PHYSICIAN           [id number]          

 18                  [id number]

 

          [facility] Commercial [id number]           Self             

   [id number]

 

           [facility] Commercial [id number]           Family Dependent    

       [id number]

 

          [facility]           [id number]           Commercial I

nsurance Co.           

[id number]

 

          [employer]     U         [id number]           Child               [id number]

 

           [employer] [facility]           [id number]           FA2        

         [id number]

 

          [employer]               [id number]           MO2                 [id number]

 

           [employer]            [id number]           FA2        

         [id number]

 

           [employer] [id number]           Family Dependent    

       [id number]

 

          [employer]     Commercial [id number]           Family Dependent           89

[id number]

 

            [employer] [place]           [id number]           FA2          

       [id number]

 

           [employer] - O/P           [id number]           19            

      [id number]

 

          [employer]                       -O/P           [id number]           19     

             [id number]

 

           [employer] REGIONAL CLAIMS [employer]     -O/P           [id number]           

19                  [id number]

 

           [employer] REG[employer]AL CLAIMS [employer]ILAN     -O/P           [id number]         

  19                  [id number]

 

           N REGIONAL CLAIMS [employer]IL[employer]-PHYSICIAN           [id number]        

   19                  [id number]

 

           [employer] Med[employer]ap Part B [id number]           Family Dependent   

        [id number]

 

          [employer]     Commercial [id number]           Family Dependent           89

[id number]

 

          [employer]               [id number]           MO2                 [id number]

 

          [employer] NO FAULT           [id number]           MO2           

      [id number]

 

          [employer]               [id number]           MO2                 [id number]

 

          [employer]ALTH[employer]/ AD O         [id number]           C                  

 [id number]

 

          [employer] PPO O         [id number]           C                   [id number]

 

           North [place] S         [id number]           P                  

 [id number]

 

          [employer]     P         [id number]           O                   [id number]

 

          [employer] NO FAULT           [id number]           MO2            

     [id number]

 

          [employer]               [id number]           MO2                 [id number]

 

          [employer] CO NO FAULT O         [id number]           C              

     [id number]

 

            [employer] [place] O         [id number]           S              

     [id number]

 

          [employer]               [id number]           MO2                 [id number]

 

          [employer] PPO P         [id number]           C                   [id number]

 

                              [id number]                               [id number]

 

                              [id number]                               [id number]

## 2021-01-21 ENCOUNTER — HOSPITAL ENCOUNTER (OUTPATIENT)
Dept: HOSPITAL 53 - M LAB REF | Age: 26
End: 2021-01-21
Attending: FAMILY MEDICINE
Payer: SELF-PAY

## 2021-01-21 DIAGNOSIS — R31.0: Primary | ICD-10-CM

## 2021-01-21 LAB
APPEARANCE UR: (no result)
BACTERIA UR QL AUTO: (no result)
BILIRUB UR QL STRIP.AUTO: NEGATIVE
GLUCOSE UR QL STRIP.AUTO: NEGATIVE MG/DL
HGB UR QL STRIP.AUTO: (no result)
KETONES UR QL STRIP.AUTO: NEGATIVE MG/DL
LEUKOCYTE ESTERASE UR QL STRIP.AUTO: (no result)
MUCOUS THREADS URNS QL MICRO: (no result)
NITRITE UR QL STRIP.AUTO: NEGATIVE
PH UR STRIP.AUTO: 5 UNITS (ref 5–9)
PROT UR QL STRIP.AUTO: (no result) MG/DL
RBC # UR AUTO: 3 /HPF (ref 0–3)
SP GR UR STRIP.AUTO: 1.02 (ref 1–1.03)
SQUAMOUS #/AREA URNS AUTO: 11 /HPF (ref 0–6)
UROBILINOGEN UR QL STRIP.AUTO: 0.2 MG/DL (ref 0–2)
WBC #/AREA URNS AUTO: 12 /HPF (ref 0–3)

## 2021-04-28 ENCOUNTER — HOSPITAL ENCOUNTER (OUTPATIENT)
Dept: HOSPITAL 53 - M SFHCWAGY | Age: 26
End: 2021-04-28
Attending: NURSE PRACTITIONER
Payer: COMMERCIAL

## 2021-04-28 DIAGNOSIS — Z01.419: Primary | ICD-10-CM

## 2021-04-28 DIAGNOSIS — Z11.3: ICD-10-CM

## 2021-04-28 PROCEDURE — 87591 N.GONORRHOEAE DNA AMP PROB: CPT

## 2021-04-28 PROCEDURE — 87491 CHLMYD TRACH DNA AMP PROBE: CPT

## 2021-05-02 ENCOUNTER — HOSPITAL ENCOUNTER (EMERGENCY)
Dept: HOSPITAL 53 - M ED | Age: 26
Discharge: HOME | End: 2021-05-02
Payer: COMMERCIAL

## 2021-05-02 VITALS — HEIGHT: 64 IN | BODY MASS INDEX: 20.53 KG/M2 | WEIGHT: 120.26 LBS

## 2021-05-02 VITALS — SYSTOLIC BLOOD PRESSURE: 122 MMHG | DIASTOLIC BLOOD PRESSURE: 74 MMHG

## 2021-05-02 DIAGNOSIS — Q63.1: ICD-10-CM

## 2021-05-02 DIAGNOSIS — Z87.59: ICD-10-CM
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
## 2021-05-02 DIAGNOSIS — F17.290: ICD-10-CM

## 2021-05-02 DIAGNOSIS — T50.901A: ICD-10-CM
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
## 2021-05-02 DIAGNOSIS — Z02.83: Primary | ICD-10-CM

## 2021-05-02 LAB
AMPHETAMINES UR QL SCN: NEGATIVE
APAP SERPL-MCNC: < 2 UG/ML (ref 10–30)
BARBITURATES UR QL SCN: NEGATIVE
BENZODIAZ UR QL SCN: NEGATIVE
BZE UR QL SCN: NEGATIVE
CANNABINOIDS UR QL SCN: NEGATIVE
METHADONE UR QL SCN: NEGATIVE
OPIATES UR QL SCN: NEGATIVE
PCP UR QL SCN: NEGATIVE
SALICYLATES SERPL-MCNC: < 1.7 MG/DL (ref 5–30)

## 2021-05-02 PROCEDURE — 80143 DRUG ASSAY ACETAMINOPHEN: CPT

## 2021-05-02 PROCEDURE — 99283 EMERGENCY DEPT VISIT LOW MDM: CPT

## 2021-05-02 PROCEDURE — 80307 DRUG TEST PRSMV CHEM ANLYZR: CPT

## 2021-05-04 LAB — Lab: (no result)

## 2021-05-12 LAB
CHLAMYDIA DNA AMPLIFICATION: NEGATIVE
N GONORRHOEA RRNA SPEC QL NAA+PROBE: NEGATIVE

## 2021-05-27 ENCOUNTER — HOSPITAL ENCOUNTER (OUTPATIENT)
Dept: HOSPITAL 53 - M LAB REF | Age: 26
End: 2021-05-27
Attending: PHYSICIAN ASSISTANT
Payer: COMMERCIAL

## 2021-05-27 DIAGNOSIS — R19.7: Primary | ICD-10-CM

## 2021-07-02 ENCOUNTER — HOSPITAL ENCOUNTER (OUTPATIENT)
Dept: HOSPITAL 53 - M RAD | Age: 26
End: 2021-07-02
Attending: OTOLARYNGOLOGY
Payer: COMMERCIAL

## 2021-07-02 DIAGNOSIS — J32.9: Primary | ICD-10-CM

## 2021-07-02 NOTE — REPVR
PROCEDURE INFORMATION: 

Exam: CT Maxillofacial Without Contrast, Sinus 

Exam date and time: 7/2/2021 10:17 AM 

Age: 25 years old 

Clinical indication: Sinusitis; Chronic; Additional info: Chronic sinusitis 



TECHNIQUE: 

Imaging protocol: CT Maxillofacial without contrast. Focus on the sinuses. 

Radiation optimization: All CT scans at this facility use at least one of these 

dose optimization techniques: automated exposure control; mA and/or kV 

adjustment per patient size (includes targeted exams where dose is matched to 

clinical indication); or iterative reconstruction. 



COMPARISON: 

CT Maxilofacial w/out contrast 2/15/2016 11:50 PM 



FINDINGS: 

Sinuses:  No sinus fluid or significant mucoperiosteal disease. 

Nasal cavity/Septum: Bilateral apryl bullosa. Asymmetric hypertrophy of the 

right inferior nasal turbinates. Patency of the ostiomeatal complexes. 

Orbital cavity: Unremarkable appearance of the globes, optic nerves, 

extraocular muscles. 

Bones/joints: No acute osseous pathology in the visualized facial bones. 

Soft tissues: Right nasal piercing. 



IMPRESSION: 

1. No significant inflammatory change in the paranasal sinuses.

2. Additional findings as described above.   



Electronically signed by: Chung Rojas On 07/02/2021  10:43:55 AM

## 2023-01-16 ENCOUNTER — INITIAL PRENATAL (OUTPATIENT)
Dept: OBSTETRICS AND GYNECOLOGY | Facility: CLINIC | Age: 28
End: 2023-01-16
Payer: OTHER GOVERNMENT

## 2023-01-16 VITALS — WEIGHT: 139 LBS | HEIGHT: 64 IN | BODY MASS INDEX: 23.73 KG/M2

## 2023-01-16 DIAGNOSIS — O36.80X0 ENCOUNTER TO DETERMINE FETAL VIABILITY OF PREGNANCY, SINGLE OR UNSPECIFIED FETUS: ICD-10-CM

## 2023-01-16 DIAGNOSIS — Z33.3 PREGNANT STATE, GESTATIONAL CARRIER: ICD-10-CM

## 2023-01-16 DIAGNOSIS — Z34.91 INITIAL OBSTETRIC VISIT IN FIRST TRIMESTER: Primary | ICD-10-CM

## 2023-01-16 DIAGNOSIS — Z34.81 PRENATAL CARE, SUBSEQUENT PREGNANCY IN FIRST TRIMESTER: ICD-10-CM

## 2023-01-16 PROCEDURE — 0501F PRENATAL FLOW SHEET: CPT | Performed by: NURSE PRACTITIONER

## 2023-01-16 RX ORDER — PROGESTERONE 50 MG/ML
INJECTION, SOLUTION INTRAMUSCULAR
COMMUNITY
Start: 2022-12-23 | End: 2023-03-14 | Stop reason: HOSPADM

## 2023-01-16 RX ORDER — ESTRADIOL 2 MG/1
TABLET ORAL
COMMUNITY
Start: 2022-12-23 | End: 2023-03-14 | Stop reason: HOSPADM

## 2023-01-16 RX ORDER — SYRINGE, DISPOSABLE, 1 ML
SYRINGE, EMPTY DISPOSABLE MISCELLANEOUS
COMMUNITY
Start: 2022-12-23 | End: 2023-03-14 | Stop reason: HOSPADM

## 2023-01-16 RX ORDER — PROGESTERONE 100 MG/1
INSERT VAGINAL
COMMUNITY
Start: 2022-12-23 | End: 2023-03-14 | Stop reason: HOSPADM

## 2023-01-16 RX ORDER — ASPIRIN 81 MG/1
81 TABLET, COATED ORAL DAILY
COMMUNITY
Start: 2022-12-23 | End: 2023-03-14 | Stop reason: HOSPADM

## 2023-01-16 RX ORDER — ESTRADIOL 0.1 MG/D
FILM, EXTENDED RELEASE TRANSDERMAL
COMMUNITY
Start: 2022-12-23 | End: 2023-03-14 | Stop reason: HOSPADM

## 2023-01-16 RX ORDER — FLUTICASONE PROPIONATE 50 MCG
1 SPRAY, SUSPENSION (ML) NASAL DAILY
COMMUNITY
Start: 2022-10-27 | End: 2023-03-14 | Stop reason: HOSPADM

## 2023-01-16 RX ORDER — NEEDLES, DISPOSABLE 25GX5/8"
NEEDLE, DISPOSABLE MISCELLANEOUS
COMMUNITY
Start: 2022-12-23 | End: 2023-03-14 | Stop reason: HOSPADM

## 2023-01-16 RX ORDER — SYRINGE WITH NEEDLE, 1 ML 25GX5/8"
SYRINGE, EMPTY DISPOSABLE MISCELLANEOUS
COMMUNITY
Start: 2022-11-08 | End: 2023-03-14 | Stop reason: HOSPADM

## 2023-01-16 NOTE — PROGRESS NOTES
[facility]  Obstetrics  Date of Service: 2023    CHIEF COMPLAINT:  New prenatal visit    HISTORY OF PRESENT ILLNESS:  Shavonne Michael is a 27 y.o. y/o  at 9w0d by 1TUS (No LMP recorded. Patient is pregnant.).  Pt is a gestational carrier for a couple in [place]. Transfer of embryo was done on 2022. Was told that her BARRERA by her first US is 2023, this US was done when embryo was 6.5 weeks along. States she is needing another US at 8 weeks for heartbeat confirmation as instructed by the surrogate agency.  Denies nausea with vomiting.  Denies breast tenderness.  She denies any vaginal bleeding.  She has started taking a prenatal vitamin.    REVIEW OF SYSTEMS  Review of Systems   Constitutional: Negative for chills, fatigue, fever, unexpected weight gain and unexpected weight loss.   Respiratory: Negative for shortness of breath.    Cardiovascular: Negative for chest pain and palpitations.   Gastrointestinal: Negative for abdominal pain, constipation, diarrhea, nausea and vomiting.   Genitourinary: Negative for breast discharge, breast lump, breast pain, difficulty urinating, dysuria, frequency, urinary incontinence, vaginal bleeding, vaginal discharge and vaginal pain.   Skin: Negative for rash.   Neurological: Negative for weakness and headache.   Psychiatric/Behavioral: Negative for sleep disturbance, depressed mood and stress.       PRENATAL RISK FACTORS  Pregnancy Problems (from 23 to present)     Problem Noted Resolved    Pregnant state, gestational carrier 2023 by Sana Rea, QUINTIN No    Overview Signed 2023 10:51 AM by Sana Rea APRN     Gestational carrier  Parents of fetus live in [place]  Baby Boy  Implantation was done on                DATING CRITERIA:  LMP: N/A  1TUS- records requested    OBSTETRIC HISTORY:  OB History    Para Term  AB Living   3 1 1   1 1   SAB IAB Ectopic Molar Multiple Live Births     1       1       # Outcome Date GA Lbr Conrado/2nd Weight Sex Delivery Anes PTL Lv   3 Current            2 Term 03/08/20 39w1d  3515 g (7 lb 12 oz)  Vag-Spont EPI  KAILA   1 IAB  25w0d            GYN HISTORY:  Denies h/o sexually transmitted infections/pelvic inflammatory disease  Denies h/o abnormal pap smears  Last pap smear:  Last year, normal-records requested  Last Completed Pap Smear     This patient has no relevant Health Maintenance data.        Denies h/o gynecologic surgeries, including biopsies of the cervix    PAST MEDICAL HISTORY:  History reviewed. No pertinent past medical history.  PAST SURGICAL HISTORY:  History reviewed. No pertinent surgical history.  FAMILY HISTORY:  History reviewed. No pertinent family history.  SOCIAL HISTORY:  Social History     Socioeconomic History   • Marital status:    Tobacco Use   • Smoking status: Never   • Smokeless tobacco: Never   Substance and Sexual Activity   • Alcohol use: Never   • Drug use: Never     GENETIC SCREENING:  Age >36 yo as of BARRERA: No  Thalassemia: No  NTD: No  CHD: No  Down Syndrome/MR/Fragile X/Autism: No  Ashkenazi Gnosticism with Tito-Sachs, [place], familial dysautonomia: No  Sickle cell disease or trait: No  Hemophilia: No  Muscular dystrophy: No  Cystic fibrosis: No  Jatin's chorea: No  Birth defects: No  Genetic/chromosomal disorders: No    INFECTION HISTORY:  TB exposure: No  HSV: No  Illness since LMP: No  Prior GBS infected child: No  STIs: No    ALLERGIES:  Allergies   Allergen Reactions   • Penicillins Hives   • Amoxicillin Hives       MEDICATIONS:  Prior to Admission medications    Medication Sig Start Date End Date Taking? Authorizing Provider   Aspirin Low Dose 81 MG EC tablet Take 81 mg by mouth Daily. 12/23/22  Yes ProviderBrett MD   Endometrin 100 MG vaginal insert INSERT 1 TABLET  VAGINALLY TWICE DAILY AND INCREASE TO THREE TIMES DAILY WITH PREGNANCY 12/23/22  Yes ProviderBrett MD   estradiol (ESTRACE) 2 MG tablet TAKE 1  "TABLET BY MOUTH / VAGINALLY EVERY DAY , UP TO 3 TABLETS EVERY DAY 12/23/22  Yes Brett Burns MD   estradiol (VIVELLE-DOT) 0.1 MG/24HR patch APPLY 1 PATCH TO SKIN EVERY OTHER DAY , INCREASING UP TO 4 PATCHES EVERY OTHER DAY 12/23/22  Yes Brett Burns MD   fluticasone (FLONASE) 50 MCG/ACT nasal spray 1 spray by Each Nare route Daily. 10/27/22  Yes Brett Burns MD   Prenatal Vit-Fe Fumarate-FA (PRENATAL PO) Prenatal   Daily   Yes Brett Burns MD   progesterone oil 50 MG/ML injection INJECT 1ML INTRAMUSCULARLY EVERY OTHER DAY 12/23/22  Yes Brett Burns MD   B-OSBALDO 3CC LUER-LAHCELLE SYR 50ZB6-0/2 18G X 1-1/2\" 3 ML misc AS DIRECTED WITH PROGESTERONE OIL TO MIX AND/OR WITHDRAW 11/8/22   Brett Burns MD   BD Disp Needles 18G X 1-1/2\" misc USE AS DIRECTED WITH PROGESTERONE OIL TO WITHDRAW 12/23/22   Brett Burns MD   BD Disp Needles 22G X 1-1/2\" misc AS DIRECTED WITH PROGESTERONE OIL TO INJECT INTRAMUSCULARLY 12/23/22   Brett Burns MD   BD Plastipak Syringe 3 ML misc USE AS DIRECTED WITH PROGESTERONE OIL 12/23/22   Brett Burns MD       PHYSICAL EXAM:   Ht 162.6 cm (64\")   Wt 63 kg (139 lb)   BMI 23.86 kg/m²   General: Alert, healthy, no distress, well nourished and well developed.  Neurologic: Alert, oriented to person, place, and time.  Gait normal.  Cranial nerves II-XII grossly intact.  HEENT: Normocephalic, atraumatic.  Extraocular muscles intact, pupils equal and reactive x2.    Teeth: Normal hygiene.  Neck: Supple, no adenopathy, thyroid normal size, non-tender, without nodularity, trachea midline.  Lungs: Normal respiratory effort.  Clear to auscultation bilaterally.  No wheezes, rhonci, or rales.  Heart: Regular rate and rhythm.  No murmer, rub or gallop.  Abdomen: Soft, non-tender, non-distended,no masses, no hepatosplenomegaly, no hernia.  Skin: No rash, no lesions.  Extremities: No cyanosis, clubbing or edema.    Bedside ultrasound " performed by myself which shows the findings below: single early IUP with cardiac activity visualized with Vscan.    IMPRESSION:  Shavonne Michael is a 27 y.o.  at 9w0d for a new prenatal visit.    PLAN:  1.  IUP at 9w0d  - Options counseling performed and patient desires continuation of pregnancy to term   - Prenatal labs ordered. Records requested for her first US and Pap test.   - Genetic testing, including cystic fibrosis, was not discussed.   - Continue prenatal vitamins  - Weight gain counseling performed.   - Pregravid BMI 18.5-24.9: Recommend 25-35 lb   - Viability US ASAP  - Return to clinic in 4 weeks for return prenatal visit  - Reviewed COVID-19 visitation policy  - Reviewed COVID-19 precautions     Diagnosis Plan   1. Initial obstetric visit in first trimester        2. Prenatal care, subsequent pregnancy in first trimester  OB Panel With HIV    Urine Drug Screen - Urine, Clean Catch    Urine Culture - Urine, Urine, Clean Catch    Urinalysis With Microscopic - Urine, Clean Catch    Chlamydia trachomatis, Neisseria gonorrhoeae, Trichomonas vaginalis, PCR - Urine, Urine, Clean Catch      3. Encounter to determine fetal viability of pregnancy, single or unspecified fetus  US Ob < 14 Weeks Single or First Gestation      4. Pregnant state, gestational carrier  US Ob < 14 Weeks Single or First Gestation        Sana Jose, APRN  2023  12:22 CST

## 2023-01-23 ENCOUNTER — PATIENT ROUNDING (BHMG ONLY) (OUTPATIENT)
Dept: OBSTETRICS AND GYNECOLOGY | Facility: CLINIC | Age: 28
End: 2023-01-23
Payer: OTHER GOVERNMENT

## 2023-01-23 NOTE — PROGRESS NOTES
January 23, 2023    Hello, may I speak with Shavonne Michael?    My name is Roxanna Genao CSA    [facility] [address] FLO[address]  [facility] [address] [phone].    Before we get started may I verify your date of birth? 1995    I am calling to officially welcome you to our practice and ask about your recent visit. Is this a good time to talk? Voicemail Left    Tell me about your visit with us. What things went well?         We're always looking for ways to make our patients' experiences even better. Do you have recommendations on ways we may improve?      Overall were you satisfied with your first visit to our practice?        I appreciate you taking the time to speak with me today. Is there anything else I can do for you?       Thank you, and have a great day.

## 2023-02-13 ENCOUNTER — ROUTINE PRENATAL (OUTPATIENT)
Dept: OBSTETRICS AND GYNECOLOGY | Facility: CLINIC | Age: 28
End: 2023-02-13
Payer: OTHER GOVERNMENT

## 2023-02-13 ENCOUNTER — LAB (OUTPATIENT)
Dept: LAB | Facility: HOSPITAL | Age: 28
End: 2023-02-13
Payer: OTHER GOVERNMENT

## 2023-02-13 VITALS — SYSTOLIC BLOOD PRESSURE: 112 MMHG | DIASTOLIC BLOOD PRESSURE: 70 MMHG | WEIGHT: 137 LBS | BODY MASS INDEX: 23.52 KG/M2

## 2023-02-13 DIAGNOSIS — Z33.3 PREGNANT STATE, GESTATIONAL CARRIER: ICD-10-CM

## 2023-02-13 DIAGNOSIS — Z3A.13 13 WEEKS GESTATION OF PREGNANCY: Primary | ICD-10-CM

## 2023-02-13 LAB
ABO GROUP BLD: NORMAL
AMPHET+METHAMPHET UR QL: NEGATIVE
AMPHETAMINES UR QL: NEGATIVE
BACTERIA UR QL AUTO: ABNORMAL /HPF
BARBITURATES UR QL SCN: NEGATIVE
BASOPHILS # BLD AUTO: 0.04 10*3/MM3 (ref 0–0.2)
BASOPHILS NFR BLD AUTO: 0.5 % (ref 0–1.5)
BENZODIAZ UR QL SCN: NEGATIVE
BILIRUB UR QL STRIP: NEGATIVE
BLD GP AB SCN SERPL QL: NEGATIVE
BUPRENORPHINE SERPL-MCNC: NEGATIVE NG/ML
CANNABINOIDS SERPL QL: NEGATIVE
CLARITY UR: CLEAR
COCAINE UR QL: NEGATIVE
COLOR UR: YELLOW
DEPRECATED RDW RBC AUTO: 41.9 FL (ref 37–54)
EOSINOPHIL # BLD AUTO: 0.35 10*3/MM3 (ref 0–0.4)
EOSINOPHIL NFR BLD AUTO: 4.2 % (ref 0.3–6.2)
ERYTHROCYTE [DISTWIDTH] IN BLOOD BY AUTOMATED COUNT: 13.8 % (ref 12.3–15.4)
GLUCOSE UR STRIP-MCNC: NEGATIVE MG/DL
HBV SURFACE AG SERPL QL IA: NORMAL
HCT VFR BLD AUTO: 40.9 % (ref 34–46.6)
HCV AB SER DONR QL: NORMAL
HGB BLD-MCNC: 13.7 G/DL (ref 12–15.9)
HGB UR QL STRIP.AUTO: NEGATIVE
HIV1+2 AB SER QL: NORMAL
HYALINE CASTS UR QL AUTO: ABNORMAL /LPF
IMM GRANULOCYTES # BLD AUTO: 0.03 10*3/MM3 (ref 0–0.05)
IMM GRANULOCYTES NFR BLD AUTO: 0.4 % (ref 0–0.5)
KETONES UR QL STRIP: NEGATIVE
LEUKOCYTE ESTERASE UR QL STRIP.AUTO: ABNORMAL
LYMPHOCYTES # BLD AUTO: 2.02 10*3/MM3 (ref 0.7–3.1)
LYMPHOCYTES NFR BLD AUTO: 24.5 % (ref 19.6–45.3)
Lab: NORMAL
MCH RBC QN AUTO: 28.2 PG (ref 26.6–33)
MCHC RBC AUTO-ENTMCNC: 33.5 G/DL (ref 31.5–35.7)
MCV RBC AUTO: 84.3 FL (ref 79–97)
METHADONE UR QL SCN: NEGATIVE
MONOCYTES # BLD AUTO: 0.43 10*3/MM3 (ref 0.1–0.9)
MONOCYTES NFR BLD AUTO: 5.2 % (ref 5–12)
NEUTROPHILS NFR BLD AUTO: 5.38 10*3/MM3 (ref 1.7–7)
NEUTROPHILS NFR BLD AUTO: 65.2 % (ref 42.7–76)
NITRITE UR QL STRIP: NEGATIVE
NRBC BLD AUTO-RTO: 0 /100 WBC (ref 0–0.2)
OPIATES UR QL: NEGATIVE
OXYCODONE UR QL SCN: NEGATIVE
PCP UR QL SCN: NEGATIVE
PH UR STRIP.AUTO: 7.5 [PH] (ref 5–8)
PLATELET # BLD AUTO: 264 10*3/MM3 (ref 140–450)
PMV BLD AUTO: 12.1 FL (ref 6–12)
PROPOXYPH UR QL: NEGATIVE
PROT UR QL STRIP: ABNORMAL
RBC # BLD AUTO: 4.85 10*6/MM3 (ref 3.77–5.28)
RBC # UR STRIP: ABNORMAL /HPF
REF LAB TEST METHOD: ABNORMAL
RH BLD: POSITIVE
RPR SER QL: NORMAL
SP GR UR STRIP: 1.02 (ref 1–1.03)
SQUAMOUS #/AREA URNS HPF: ABNORMAL /HPF
TRICYCLICS UR QL SCN: NEGATIVE
UROBILINOGEN UR QL STRIP: ABNORMAL
WBC # UR STRIP: ABNORMAL /HPF
WBC NRBC COR # BLD: 8.25 10*3/MM3 (ref 3.4–10.8)

## 2023-02-13 PROCEDURE — 87491 CHLMYD TRACH DNA AMP PROBE: CPT | Performed by: NURSE PRACTITIONER

## 2023-02-13 PROCEDURE — 81001 URINALYSIS AUTO W/SCOPE: CPT | Performed by: NURSE PRACTITIONER

## 2023-02-13 PROCEDURE — 87591 N.GONORRHOEAE DNA AMP PROB: CPT | Performed by: NURSE PRACTITIONER

## 2023-02-13 PROCEDURE — 80081 OBSTETRIC PANEL INC HIV TSTG: CPT | Performed by: NURSE PRACTITIONER

## 2023-02-13 PROCEDURE — 0502F SUBSEQUENT PRENATAL CARE: CPT | Performed by: NURSE PRACTITIONER

## 2023-02-13 PROCEDURE — 87661 TRICHOMONAS VAGINALIS AMPLIF: CPT | Performed by: NURSE PRACTITIONER

## 2023-02-13 PROCEDURE — 87086 URINE CULTURE/COLONY COUNT: CPT | Performed by: NURSE PRACTITIONER

## 2023-02-13 PROCEDURE — 80306 DRUG TEST PRSMV INSTRMNT: CPT | Performed by: NURSE PRACTITIONER

## 2023-02-13 PROCEDURE — 86803 HEPATITIS C AB TEST: CPT | Performed by: NURSE PRACTITIONER

## 2023-02-13 NOTE — PROGRESS NOTES
CC: Prenatal visit    Shavonne Michael is a 27 y.o.  at 13w1d.  Doing well.  Denies N/V, dysuria, abnormal vaginal d/c, headaches, heartburn, constipation, cramping, regular contractions, LOF, or VB. Is now off all her fertility medications. Per pt, her surrogacy center is fine to go by her due date based her her first dating US.     /70   Wt 62.1 kg (137 lb)   BMI 23.52 kg/m²   SVE: N/A     Fetal Heart Rate: +Vscan    Pregnancy Problems (from 25 to present)     Problem Noted Resolved    Pregnant state, gestational carrier 2023 by Sana Rea APRN No    Overview Addendum 2023 10:46 AM by Sana Rea APRN     Gestational carrier  Parents of fetus live in [place]  Baby Boy  Final BARRERA of 2023 by ARUNA                A/P: Shavonne Michael is a 27 y.o.  at 13w1d.  - Counseled to complete NOB labs today  - Cramping precautions  - RTC in 4 weeks     Diagnosis Plan   1. 13 weeks gestation of pregnancy        2. Pregnant state, gestational carrier          QUINTIN Ding  2023  10:52 CST   
Home

## 2023-02-14 LAB
C TRACH RRNA CVX QL NAA+PROBE: NEGATIVE
N GONORRHOEA RRNA SPEC QL NAA+PROBE: NEGATIVE
RUBV IGG SERPL IA-ACNC: 1.31 INDEX
TRICHOMONAS VAGINALIS PCR: NEGATIVE

## 2023-02-15 LAB — BACTERIA SPEC AEROBE CULT: NO GROWTH

## 2023-03-14 ENCOUNTER — ROUTINE PRENATAL (OUTPATIENT)
Dept: OBSTETRICS AND GYNECOLOGY | Facility: CLINIC | Age: 28
End: 2023-03-14
Payer: OTHER GOVERNMENT

## 2023-03-14 VITALS — SYSTOLIC BLOOD PRESSURE: 110 MMHG | DIASTOLIC BLOOD PRESSURE: 66 MMHG | WEIGHT: 137 LBS | BODY MASS INDEX: 23.52 KG/M2

## 2023-03-14 DIAGNOSIS — Z3A.17 17 WEEKS GESTATION OF PREGNANCY: Primary | ICD-10-CM

## 2023-03-14 DIAGNOSIS — Z36.89 ENCOUNTER FOR FETAL ANATOMIC SURVEY: ICD-10-CM

## 2023-03-14 DIAGNOSIS — Z33.3 PREGNANT STATE, GESTATIONAL CARRIER: ICD-10-CM

## 2023-03-14 PROCEDURE — 0502F SUBSEQUENT PRENATAL CARE: CPT | Performed by: NURSE PRACTITIONER

## 2023-03-14 NOTE — PROGRESS NOTES
CC: Prenatal visit    Shavonne Michael is a 27 y.o.  at 17w2d.  Doing well.  Denies N/V, dysuria, abnormal vaginal d/c, headaches, heartburn, constipation, cramping, regular contractions, LOF, or VB.  Has started to feel flutters.     /66   Wt 62.1 kg (137 lb)   BMI 23.52 kg/m²   SVE: Deferred     Fetal Heart Rate: 150   +Vscan    Pregnancy Problems (from 23 to present)     Problem Noted Resolved    Pregnant state, gestational carrier 2023 by Sana Rea, QUINTIN No    Overview Addendum 2023 10:46 AM by Sana Rea, QUINTIN     Gestational carrier  Parents of fetus live in [place]  Baby Boy  Final BARRERA of 2023 by ARUNA                A/P: Shavonne Michael is a 27 y.o.  at 17w2d.  - Anatomy US in 3 weeks  - RTC in 3 weeks     Diagnosis Plan   1. 17 weeks gestation of pregnancy        2. Pregnant state, gestational carrier  US Ob 14 + Weeks Single or First Gestation      3. Encounter for fetal anatomic survey  US Ob 14 + Weeks Single or First Gestation        QUINTIN Ding  3/14/2023  10:04 CDT

## 2023-04-07 ENCOUNTER — TELEPHONE (OUTPATIENT)
Dept: OBSTETRICS AND GYNECOLOGY | Facility: CLINIC | Age: 28
End: 2023-04-07

## 2023-04-07 NOTE — TELEPHONE ENCOUNTER
Patient missed her anatomy ultrasound,we called her to let her know and resh her,she said she was told her appointments started at 9:15am/which that was her drs appt an hour after her ultrasound appt/which is how we do the appts..she said she got a reminder that told her her appt was at 915am.the   give them an appt  Reminder paper w date and time of first appt and try and always tell them that they will not get a text or my chart message reminder about ultrasounds..Patient said no one told her and she never got reminder appt paper..I apologized and told her we would have to resh because the ultrasound was full and only here half a day..She was very upset,I tried to work with her and get her in asap,but she worked and was leaving to go to [place] next week for a week..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

## 2023-04-24 ENCOUNTER — ROUTINE PRENATAL (OUTPATIENT)
Dept: OBSTETRICS AND GYNECOLOGY | Facility: CLINIC | Age: 28
End: 2023-04-24
Payer: OTHER GOVERNMENT

## 2023-04-24 ENCOUNTER — LAB (OUTPATIENT)
Dept: LAB | Facility: HOSPITAL | Age: 28
End: 2023-04-24
Payer: OTHER GOVERNMENT

## 2023-04-24 VITALS — BODY MASS INDEX: 25.4 KG/M2 | WEIGHT: 148 LBS | DIASTOLIC BLOOD PRESSURE: 64 MMHG | SYSTOLIC BLOOD PRESSURE: 108 MMHG

## 2023-04-24 DIAGNOSIS — Z3A.23 23 WEEKS GESTATION OF PREGNANCY: Primary | ICD-10-CM

## 2023-04-24 DIAGNOSIS — Z33.3 PREGNANT STATE, GESTATIONAL CARRIER: ICD-10-CM

## 2023-04-24 DIAGNOSIS — Z13.79 ENCOUNTER FOR GENETIC SCREENING FOR DOWN SYNDROME: ICD-10-CM

## 2023-04-24 PROCEDURE — 0502F SUBSEQUENT PRENATAL CARE: CPT | Performed by: NURSE PRACTITIONER

## 2023-04-24 NOTE — PROGRESS NOTES
CC: Prenatal visit    Shavonne Michael is a 27 y.o.  at 23w1d.  Doing well.  No complaints.  Her surrogate couple desires NIPS testing.  The baby's mother speaks mandarin and patient asks if we have . Discussed  options, she prefers to set up mandarin .  She desires to meet both of our OB/GYN physicians.  Denies contractions, LOF, or VB.  Reports good FM.    /64   Wt 67.1 kg (148 lb)   BMI 25.40 kg/m²         Preliminary fetal anatomy US- fetus breech presentation, MARIA ELENA 12.26 cm, placenta posterior- no previa, HC 39.6%tile, AC 48.4%tile, EFW 580g/ 1 lb 4 oz @ 48.7%tile, all fetal anatomy views obtained, 3vc, BOY!, CL 4.52 cm, bilateral ovaries wnl    Pregnancy Problems (from 23 to present)     Problem Noted Resolved    Pregnant state, gestational carrier 2023 by Sana Rea APRN No    Overview Addendum 2023 10:46 AM by Sana Rea APRN     Gestational carrier  Parents of fetus live in [place]  Baby Boy  Final BARRERA of 2023 by ARUNA                A/P: Shavonne Michael is a 27 y.o.  at 23w1d.  3T labs and Tdap at next appointment   NIPS today- discussed potential cost, she v/u  - RTC in 4 weeks     Diagnosis Plan   1. 23 weeks gestation of pregnancy        2. Pregnant state, gestational carrier        3. Encounter for genetic screening for Down Syndrome  Quentin Rodriguez Prenatal Test: Chromosomes 13, 18, 21, X & Y: Triploidy 22Q.11.2 Deletion - Blood,          QUINTIN Cesar  2023  12:09 CDT

## 2023-05-01 LAB

## 2023-05-02 DIAGNOSIS — Z13.79 ENCOUNTER FOR GENETIC SCREENING FOR DOWN SYNDROME: Primary | ICD-10-CM

## 2023-05-02 DIAGNOSIS — Z36.89 ENCOUNTER FOR FETAL ANATOMIC SURVEY: ICD-10-CM

## 2023-05-22 ENCOUNTER — ROUTINE PRENATAL (OUTPATIENT)
Dept: OBSTETRICS AND GYNECOLOGY | Facility: CLINIC | Age: 28
End: 2023-05-22
Payer: OTHER GOVERNMENT

## 2023-05-22 VITALS — WEIGHT: 155.8 LBS | SYSTOLIC BLOOD PRESSURE: 100 MMHG | BODY MASS INDEX: 26.74 KG/M2 | DIASTOLIC BLOOD PRESSURE: 70 MMHG

## 2023-05-22 DIAGNOSIS — Z33.3 PREGNANT STATE, GESTATIONAL CARRIER: Primary | ICD-10-CM

## 2023-05-22 DIAGNOSIS — Z13.1 SCREENING FOR DIABETES MELLITUS: ICD-10-CM

## 2023-05-22 PROCEDURE — 0502F SUBSEQUENT PRENATAL CARE: CPT | Performed by: STUDENT IN AN ORGANIZED HEALTH CARE EDUCATION/TRAINING PROGRAM

## 2023-05-22 NOTE — PROGRESS NOTES
CC: Prenatal visit    Shavonne Michael is a 27 y.o.  at 27w1d.  Doing well.  Denies contractions, LOF, or VB.  Reports good FM.     insufficient fetal DNA, will discuss option for redraw with another company with family in [place]    GERD-takes apple cider vinegar    /70   Wt 70.7 kg (155 lb 12.8 oz)   BMI 26.74 kg/m²   SVE: deferred  Fundal Height (cm): 27 cm  Fetal Heart Rate: 145      A/P: Shavonne Michael is a 27 y.o.  at 27w1d.  - RTC in 2 weeks  - 3T labs today, Tdap  - Discussed with IVF pregnancy consider fetal echo, weekly surveillance with BPP or NST weekly at 36 weeks>>will discuss with parents in [place]  - Reviewed COVID-19 visitation policy  - Reviewed COVID-19 precautions    Problem List Items Addressed This Visit        Other    Pregnant state, gestational carrier - Primary    Overview     Gestational carrier  Parents of fetus live in [place]  Baby Boy  Final BARRERA of 2023 by 1TUS         Other Visit Diagnoses     Screening for diabetes mellitus        Relevant Orders    Glucose, Post 50 Gm Glucola (Completed)    CBC & Differential (Completed)             Diagnosis Plan   1. Pregnant state, gestational carrier        2. Screening for diabetes mellitus  Glucose, Post 50 Gm Glucola    CBC & Differential        Janneth Hamilton DO  2023  17:09 CDT

## 2023-06-09 ENCOUNTER — LAB (OUTPATIENT)
Dept: LAB | Facility: HOSPITAL | Age: 28
End: 2023-06-09
Payer: OTHER GOVERNMENT

## 2023-06-09 ENCOUNTER — ROUTINE PRENATAL (OUTPATIENT)
Dept: OBSTETRICS AND GYNECOLOGY | Facility: CLINIC | Age: 28
End: 2023-06-09
Payer: OTHER GOVERNMENT

## 2023-06-09 VITALS — WEIGHT: 162 LBS | BODY MASS INDEX: 27.81 KG/M2 | DIASTOLIC BLOOD PRESSURE: 76 MMHG | SYSTOLIC BLOOD PRESSURE: 118 MMHG

## 2023-06-09 DIAGNOSIS — Z34.83 ENCOUNTER FOR SUPERVISION OF OTHER NORMAL PREGNANCY IN THIRD TRIMESTER: Primary | ICD-10-CM

## 2023-06-09 DIAGNOSIS — Z23 NEED FOR TDAP VACCINATION: ICD-10-CM

## 2023-06-09 DIAGNOSIS — Z33.3 PREGNANT STATE, GESTATIONAL CARRIER: ICD-10-CM

## 2023-06-09 DIAGNOSIS — Z3A.29 29 WEEKS GESTATION OF PREGNANCY: ICD-10-CM

## 2023-06-09 DIAGNOSIS — Z13.1 SCREENING FOR DIABETES MELLITUS: ICD-10-CM

## 2023-06-09 DIAGNOSIS — O99.810 ABNORMAL GLUCOSE TOLERANCE IN PREGNANCY: Primary | ICD-10-CM

## 2023-06-09 PROBLEM — Z34.90 SUPERVISION OF NORMAL PREGNANCY: Status: ACTIVE | Noted: 2023-06-09

## 2023-06-09 LAB
BASOPHILS # BLD AUTO: 0.03 10*3/MM3 (ref 0–0.2)
BASOPHILS NFR BLD AUTO: 0.3 % (ref 0–1.5)
DEPRECATED RDW RBC AUTO: 40.7 FL (ref 37–54)
EOSINOPHIL # BLD AUTO: 0.15 10*3/MM3 (ref 0–0.4)
EOSINOPHIL NFR BLD AUTO: 1.5 % (ref 0.3–6.2)
ERYTHROCYTE [DISTWIDTH] IN BLOOD BY AUTOMATED COUNT: 13.6 % (ref 12.3–15.4)
GLUCOSE 1H P 100 G GLC PO SERPL-MCNC: 140 MG/DL (ref 65–139)
HCT VFR BLD AUTO: 35.4 % (ref 34–46.6)
HGB BLD-MCNC: 11.9 G/DL (ref 12–15.9)
IMM GRANULOCYTES # BLD AUTO: 0.09 10*3/MM3 (ref 0–0.05)
IMM GRANULOCYTES NFR BLD AUTO: 0.9 % (ref 0–0.5)
LYMPHOCYTES # BLD AUTO: 1.85 10*3/MM3 (ref 0.7–3.1)
LYMPHOCYTES NFR BLD AUTO: 18.6 % (ref 19.6–45.3)
MCH RBC QN AUTO: 28.1 PG (ref 26.6–33)
MCHC RBC AUTO-ENTMCNC: 33.6 G/DL (ref 31.5–35.7)
MCV RBC AUTO: 83.7 FL (ref 79–97)
MONOCYTES # BLD AUTO: 0.58 10*3/MM3 (ref 0.1–0.9)
MONOCYTES NFR BLD AUTO: 5.8 % (ref 5–12)
NEUTROPHILS NFR BLD AUTO: 7.22 10*3/MM3 (ref 1.7–7)
NEUTROPHILS NFR BLD AUTO: 72.9 % (ref 42.7–76)
NRBC BLD AUTO-RTO: 0 /100 WBC (ref 0–0.2)
PLATELET # BLD AUTO: 227 10*3/MM3 (ref 140–450)
PMV BLD AUTO: 12.3 FL (ref 6–12)
RBC # BLD AUTO: 4.23 10*6/MM3 (ref 3.77–5.28)
WBC NRBC COR # BLD: 9.92 10*3/MM3 (ref 3.4–10.8)

## 2023-06-09 PROCEDURE — 85025 COMPLETE CBC W/AUTO DIFF WBC: CPT

## 2023-06-09 PROCEDURE — 82950 GLUCOSE TEST: CPT

## 2023-06-09 NOTE — PROGRESS NOTES
CC: Prenatal visit    Shavonne Michael is a 27 y.o.  at 29w5d.  Doing well.  Denies contractions, LOF, or VB.  Reports good FM.  Reports some minor pains to the right of her belly button.    /76   Wt 73.5 kg (162 lb)   BMI 27.81 kg/m²    Abd: Soft, NTND, no hernia palpated  Fundal Height (cm): 29 cm  Fetal Heart Rate: 142    Pregnancy Problems (from 23 to present)     Problem Noted Resolved    Supervision of normal pregnancy 2023 by Suly Solis MD No    Pregnant state, gestational carrier 2023 by Sana Rea APRN No    Overview Addendum 2023 10:46 AM by Sana Rea APRN     Gestational carrier  Parents of fetus live in [place]  Baby Boy  Final BARRERA of 2023 by ARUNA                A/P: Shavonne Michael is a 27 y.o.  at 29w5d.  - RTC in 2 weeks  - 3T labs today  - Tdap today  - NIPT redraw today  - Does not need breastpump script  - Reassurance provided, suspect muscles stretching     Diagnosis Plan   1. Encounter for supervision of other normal pregnancy in third trimester        2. Pregnant state, gestational carrier        3. 29 weeks gestation of pregnancy        4. Need for Tdap vaccination  Tdap Vaccine Greater Than or Equal To 8yo IM        Suly Solis MD  2023  18:14 CDT

## 2023-06-13 ENCOUNTER — LAB (OUTPATIENT)
Dept: LAB | Facility: HOSPITAL | Age: 28
End: 2023-06-13
Payer: OTHER GOVERNMENT

## 2023-06-13 DIAGNOSIS — O99.810 ABNORMAL GLUCOSE TOLERANCE IN PREGNANCY: ICD-10-CM

## 2023-06-13 LAB
GLUCOSE P FAST SERPL-MCNC: 80 MG/DL (ref 65–94)
GTT GEST 2H PNL UR+SERPL: 160 MG/DL (ref 65–179)
GTT GEST 3H PNL SERPL: 106 MG/DL (ref 65–139)
GTT GEST 3H PNL SERPL: 128 MG/DL (ref 65–154)

## 2023-06-13 PROCEDURE — 36415 COLL VENOUS BLD VENIPUNCTURE: CPT

## 2023-06-13 PROCEDURE — 82951 GLUCOSE TOLERANCE TEST (GTT): CPT

## 2023-06-13 PROCEDURE — 82952 GTT-ADDED SAMPLES: CPT

## 2023-06-14 ENCOUNTER — TELEPHONE (OUTPATIENT)
Dept: OBSTETRICS AND GYNECOLOGY | Facility: CLINIC | Age: 28
End: 2023-06-14
Payer: OTHER GOVERNMENT

## 2023-06-14 NOTE — TELEPHONE ENCOUNTER
Spoke with patient and let her know that per Dr. Hamilton everything is normal with her glucose test and she does not have diabetes in pregnancy. She verbalized her understanding.

## 2023-06-14 NOTE — TELEPHONE ENCOUNTER
PATIENT CALLED AND IS NEEDING A CALL BACK IN REGARDS TO HER GLUCOSE TEST THAT SHE HAD DONE. HER NUMBER TO CALL BACK [phone].          THANKS,        DIOGO

## 2023-06-20 PROBLEM — Z34.83 NORMAL PREGNANCY IN MULTIGRAVIDA IN THIRD TRIMESTER: Status: ACTIVE | Noted: 2023-06-09

## 2023-07-25 ENCOUNTER — ROUTINE PRENATAL (OUTPATIENT)
Dept: OBSTETRICS AND GYNECOLOGY | Facility: CLINIC | Age: 28
End: 2023-07-25
Payer: COMMERCIAL

## 2023-07-25 VITALS — WEIGHT: 173 LBS | DIASTOLIC BLOOD PRESSURE: 66 MMHG | BODY MASS INDEX: 29.7 KG/M2 | SYSTOLIC BLOOD PRESSURE: 112 MMHG

## 2023-07-25 DIAGNOSIS — Z34.83 NORMAL PREGNANCY IN MULTIGRAVIDA IN THIRD TRIMESTER: ICD-10-CM

## 2023-07-25 DIAGNOSIS — Z33.3 SURROGATE PREGNANCY IN THIRD TRIMESTER: ICD-10-CM

## 2023-07-25 DIAGNOSIS — Z3A.36 36 WEEKS GESTATION OF PREGNANCY: Primary | ICD-10-CM

## 2023-07-25 PROCEDURE — 0502F SUBSEQUENT PRENATAL CARE: CPT | Performed by: NURSE PRACTITIONER

## 2023-07-25 PROCEDURE — 87653 STREP B DNA AMP PROBE: CPT | Performed by: NURSE PRACTITIONER

## 2023-07-25 NOTE — PROGRESS NOTES
CC: Prenatal visit    Shavonne Michael is a 27 y.o.  at 36w2d.  Doing well.  No complaints.  Denies contractions, LOF, or VB.  Reports good FM.    /66   Wt 78.5 kg (173 lb)   BMI 29.70 kg/m²     US preliminary- fetus vertex, MARIA ELENA 11.13 cm,  bpm, BPD 81.3%tile, HC 34.4%tile, AC 56.8%tile, FL 21.4%tile, HL 77.2%tile, EFW 2849 g/6 lb 4 oz @ 47.3%tile, BPP       SVE: posterior/thick/high    RV GBS swab obtained     Pregnancy Problems (from 23 to present)       Problem Noted Resolved    Normal pregnancy in multigravida in third trimester 2023 by Suly Solis MD No    Surrogate pregnancy in third trimester 2023 by Sana Rea APRN No    Overview Addendum 2023 11:03 AM by Sana Rea APRN     Gestational carrier  Parents of fetus live in [place]. Parents will be arriving to [place] .  Baby Boy  Final BARRERA of 2023 by 1TUS                  A/P: Shavonne Michael is a 27 y.o.  at 36w2d.  Discussed delivery planning. Pt undecided, but her couple will be in the states 2023.    - RTC in 1 weeks     Diagnosis Plan   1. 36 weeks gestation of pregnancy  Group B Strep (Molecular) - Swab, Vaginal/Rectum      2. Normal pregnancy in multigravida in third trimester        3. Surrogate pregnancy in third trimester            QUINTIN Cesar  2023  16:34 CDT\

## 2023-07-26 LAB — GROUP B STREP, DNA: NEGATIVE

## 2023-08-01 ENCOUNTER — HOSPITAL ENCOUNTER (OUTPATIENT)
Facility: HOSPITAL | Age: 28
Discharge: HOME | End: 2023-08-02
Attending: STUDENT IN AN ORGANIZED HEALTH CARE EDUCATION/TRAINING PROGRAM | Admitting: STUDENT IN AN ORGANIZED HEALTH CARE EDUCATION/TRAINING PROGRAM
Payer: COMMERCIAL

## 2023-08-01 ENCOUNTER — LAB (OUTPATIENT)
Dept: LAB | Facility: HOSPITAL | Age: 28
End: 2023-08-01
Payer: COMMERCIAL

## 2023-08-01 ENCOUNTER — ROUTINE PRENATAL (OUTPATIENT)
Dept: OBSTETRICS AND GYNECOLOGY | Facility: CLINIC | Age: 28
End: 2023-08-01
Payer: COMMERCIAL

## 2023-08-01 VITALS — DIASTOLIC BLOOD PRESSURE: 60 MMHG | WEIGHT: 173.8 LBS | SYSTOLIC BLOOD PRESSURE: 108 MMHG | BODY MASS INDEX: 29.83 KG/M2

## 2023-08-01 DIAGNOSIS — Z34.83 NORMAL PREGNANCY IN MULTIGRAVIDA IN THIRD TRIMESTER: ICD-10-CM

## 2023-08-01 DIAGNOSIS — Z33.3 SURROGATE PREGNANCY IN THIRD TRIMESTER: Primary | ICD-10-CM

## 2023-08-01 DIAGNOSIS — N89.8 VAGINAL IRRITATION: ICD-10-CM

## 2023-08-01 LAB
BACTERIA UR QL AUTO: ABNORMAL /HPF
BACTERIA UR QL AUTO: ABNORMAL /HPF
BILIRUB UR QL STRIP: NEGATIVE
BILIRUB UR QL STRIP: NEGATIVE
CLARITY UR: CLEAR
CLARITY UR: CLEAR
COLOR UR: YELLOW
COLOR UR: YELLOW
GLUCOSE UR STRIP-MCNC: NEGATIVE MG/DL
GLUCOSE UR STRIP-MCNC: NEGATIVE MG/DL
HGB UR QL STRIP.AUTO: NEGATIVE
HGB UR QL STRIP.AUTO: NEGATIVE
HYALINE CASTS UR QL AUTO: ABNORMAL /LPF
HYALINE CASTS UR QL AUTO: ABNORMAL /LPF
KETONES UR QL STRIP: NEGATIVE
KETONES UR QL STRIP: NEGATIVE
LEUKOCYTE ESTERASE UR QL STRIP.AUTO: ABNORMAL
LEUKOCYTE ESTERASE UR QL STRIP.AUTO: ABNORMAL
NITRITE UR QL STRIP: NEGATIVE
NITRITE UR QL STRIP: NEGATIVE
PH UR STRIP.AUTO: 7 [PH] (ref 5–9)
PH UR STRIP.AUTO: 7.5 [PH] (ref 5–8)
PROT UR QL STRIP: NEGATIVE
PROT UR QL STRIP: NEGATIVE
RBC # UR STRIP: ABNORMAL /HPF
RBC # UR STRIP: ABNORMAL /HPF
REF LAB TEST METHOD: ABNORMAL
REF LAB TEST METHOD: ABNORMAL
SP GR UR STRIP: 1.01 (ref 1–1.03)
SP GR UR STRIP: 1.01 (ref 1–1.03)
SQUAMOUS #/AREA URNS HPF: ABNORMAL /HPF
SQUAMOUS #/AREA URNS HPF: ABNORMAL /HPF
UROBILINOGEN UR QL STRIP: ABNORMAL
UROBILINOGEN UR QL STRIP: ABNORMAL
WBC # UR STRIP: ABNORMAL /HPF
WBC # UR STRIP: ABNORMAL /HPF

## 2023-08-01 PROCEDURE — 81001 URINALYSIS AUTO W/SCOPE: CPT

## 2023-08-01 PROCEDURE — 81001 URINALYSIS AUTO W/SCOPE: CPT | Performed by: STUDENT IN AN ORGANIZED HEALTH CARE EDUCATION/TRAINING PROGRAM

## 2023-08-01 RX ORDER — CETIRIZINE HYDROCHLORIDE 10 MG/1
10 TABLET ORAL DAILY
COMMUNITY

## 2023-08-01 RX ORDER — FLUTICASONE PROPIONATE 50 MCG
SPRAY, SUSPENSION (ML) NASAL
COMMUNITY

## 2023-08-01 NOTE — PROGRESS NOTES
CC: Prenatal visit    Shavonne Michael is a 27 y.o.  at 37w2d.  Doing well.  Denies contractions, LOF, or VB.  Reports good FM.    /60   Wt 78.8 kg (173 lb 12.8 oz)   BMI 29.83 kg/mý   SVE: 3     Fetal Heart Rate: 129 us    Prelim US: cephalic, posterior left placenta, MARIA ELENA 8.8 cm,  bpm, BPP 8/8    A/P: Shavonne Michael is a 27 y.o.  at 37w2d.  - RTC in 1 weeks  - EIOL with Dr. Solis  - Reviewed COVID-19 visitation policy  - Reviewed COVID-19 precautions    Problem List Items Addressed This Visit          Gravid and     Surrogate pregnancy in third trimester - Primary    Overview     Gestational carrier  Parents of fetus live in [place]. Parents will be arriving to [place] .  Baby Boy  Final BARRERA of 2023 by 1TUS          Normal pregnancy in multigravida in third trimester     Other Visit Diagnoses       Vaginal irritation        Relevant Orders    Urinalysis With Culture If Indicated - Urine, Clean Catch (Completed)               Diagnosis Plan   1. Surrogate pregnancy in third trimester        2. Normal pregnancy in multigravida in third trimester        3. Vaginal irritation  Urinalysis With Culture If Indicated - Urine, Clean Catch        Janneth Hamilton DO  2023  18:21 CDT

## 2023-08-02 VITALS
RESPIRATION RATE: 18 BRPM | OXYGEN SATURATION: 99 % | SYSTOLIC BLOOD PRESSURE: 117 MMHG | TEMPERATURE: 97.5 F | DIASTOLIC BLOOD PRESSURE: 70 MMHG | HEART RATE: 80 BPM

## 2023-08-02 PROCEDURE — 63710000001 PROCHLORPERAZINE MALEATE PER 10 MG: Performed by: STUDENT IN AN ORGANIZED HEALTH CARE EDUCATION/TRAINING PROGRAM

## 2023-08-02 PROCEDURE — G0463 HOSPITAL OUTPT CLINIC VISIT: HCPCS

## 2023-08-02 PROCEDURE — 59025 FETAL NON-STRESS TEST: CPT

## 2023-08-02 RX ORDER — PROCHLORPERAZINE MALEATE 10 MG
10 TABLET ORAL ONCE
Status: COMPLETED | OUTPATIENT
Start: 2023-08-02 | End: 2023-08-02

## 2023-08-02 RX ORDER — ACETAMINOPHEN 500 MG
1000 TABLET ORAL ONCE
Status: COMPLETED | OUTPATIENT
Start: 2023-08-02 | End: 2023-08-02

## 2023-08-02 RX ADMIN — ACETAMINOPHEN 1000 MG: 500 TABLET, FILM COATED ORAL at 00:37

## 2023-08-02 RX ADMIN — PROCHLORPERAZINE MALEATE 10 MG: 10 TABLET ORAL at 00:39

## 2023-08-02 NOTE — NON STRESS TEST
Shavonne Michael, a  at 37w3d with an BARRERA of 2023, by Ultrasound, was seen at [facility] LABOR DELIVERY for a nonstress test.    Chief Complaint   Patient presents with    Elevated Blood Pressure     States she felt funny and was told it could be blood pressure related       Patient Active Problem List   Diagnosis    Surrogate pregnancy in third trimester    Normal pregnancy in multigravida in third trimester       Start Time:   Stop Time:     Interpretation A  Nonstress Test Interpretation A: Reactive  Comments A: reviewed with CHRISTINA Rome RN

## 2023-08-02 NOTE — DISCHARGE INSTR - ACTIVITY
Return to hospital for...  Uterine contractions that are 2-3 minutes apart and do not go away with change of activity.  Vaginal bleeding.  Leaking of fluid or your water breaks.  Decreased fetal movement.    Take all medications as prescribed and keep your follow up appointment with your provider.

## 2023-08-02 NOTE — PLAN OF CARE
Problem: Adult Inpatient Plan of Care  Goal: Plan of Care Review  Outcome: Met  Flowsheets (Taken 8/2/2023 0110)  Progress: no change  Plan of Care Reviewed With: patient  Outcome Evaluation: VSS, no elevated BP, patient c/o headache and hazey vision, after tylenol and compazine there is no change in headache, doctor discharged patient since it has not gotten worse, patient instructed to call unit or to come back if its not improved in the morning or if it has gotten worse, patient has no other complaints or concerns  Goal: Patient-Specific Goal (Individualized)  Outcome: Met  Goal: Absence of Hospital-Acquired Illness or Injury  Outcome: Met  Goal: Optimal Comfort and Wellbeing  Outcome: Met  Goal: Readiness for Transition of Care  Outcome: Met  Intervention: Mutually Develop Transition Plan  Recent Flowsheet Documentation  Taken 8/2/2023 0106 by Radha Montero RN  Equipment Needed After Discharge: none  Equipment Currently Used at Home: none  Anticipated Changes Related to Illness: none  Transportation Anticipated: car, drives self  Transportation Concerns: none  Concerns to be Addressed: no discharge needs identified  Readmission Within the Last 30 Days: no previous admission in last 30 days  Patient/Family Anticipated Services at Transition: none  Patient/Family Anticipates Transition to: home   Goal Outcome Evaluation:  Plan of Care Reviewed With: patient        Progress: no change  Outcome Evaluation: VSS, no elevated BP, patient c/o headache and hazey vision, after tylenol and compazine there is no change in headache, doctor discharged patient since it has not gotten worse, patient instructed to call unit or to come back if its not improved in the morning or if it has gotten worse, patient has no other complaints or concerns

## 2023-08-08 ENCOUNTER — ROUTINE PRENATAL (OUTPATIENT)
Dept: OBSTETRICS AND GYNECOLOGY | Facility: CLINIC | Age: 28
End: 2023-08-08
Payer: COMMERCIAL

## 2023-08-08 VITALS — DIASTOLIC BLOOD PRESSURE: 56 MMHG | BODY MASS INDEX: 29.83 KG/M2 | SYSTOLIC BLOOD PRESSURE: 110 MMHG | WEIGHT: 173.8 LBS

## 2023-08-08 DIAGNOSIS — Z34.83 NORMAL PREGNANCY IN MULTIGRAVIDA IN THIRD TRIMESTER: ICD-10-CM

## 2023-08-08 DIAGNOSIS — Z33.3 SURROGATE PREGNANCY IN THIRD TRIMESTER: Primary | ICD-10-CM

## 2023-08-08 NOTE — PROGRESS NOTES
CC: Prenatal visit    Shavonne Michael is a 27 y.o.  at 38w2d.  Doing well.  Denies contractions, LOF, or VB.  Reports good FM.    /56   Wt 78.8 kg (173 lb 12.8 oz)   BMI 29.83 kg/mý   SVE: 2.5/50/-3     Fetal Heart Rate: 138 us    Prelim US: cephalic, posterior placenta, MARIA ELENA 9.23 cm,  bpm, BPP 8/8    A/P: Shavonne Michael is a 27 y.o.  at 38w2d.  - RTC in 1 weeks  - Surrogate family in [place]; excited; interested in 40 week EIOL if no delivery sooner; family declines earlier IOL  - Reviewed COVID-19 visitation policy  - Reviewed COVID-19 precautions    Problem List Items Addressed This Visit          Gravid and     Surrogate pregnancy in third trimester - Primary    Overview     Gestational carrier  Parents of fetus live in [place]. Parents will be arriving to [place] .  Baby Boy  Final BARRERA of 2023 by 1TUS          Normal pregnancy in multigravida in third trimester          Diagnosis Plan   1. Surrogate pregnancy in third trimester        2. Normal pregnancy in multigravida in third trimester          Janneth Hamilton DO  2023  12:17 CDT

## 2023-08-14 ENCOUNTER — TELEPHONE (OUTPATIENT)
Dept: OBSTETRICS AND GYNECOLOGY | Facility: CLINIC | Age: 28
End: 2023-08-14

## 2023-08-14 NOTE — TELEPHONE ENCOUNTER
PATIENT CALLED AND IS WONDERING WHEN HER INDUCTION IS GOING TO BE SCHEDULED AND TIME? HER NUMBER TO CALL BACK [phone].        THANKS,      DIOGO

## 2023-08-14 NOTE — TELEPHONE ENCOUNTER
Spoke with patient who wanted her induction moved from the 23rd to the 21st. Called L&D and was able to get it moved for her. Let the patient know and she verbalized her understanding.

## 2023-08-15 ENCOUNTER — ROUTINE PRENATAL (OUTPATIENT)
Dept: OBSTETRICS AND GYNECOLOGY | Facility: CLINIC | Age: 28
End: 2023-08-15
Payer: COMMERCIAL

## 2023-08-15 VITALS — BODY MASS INDEX: 30.55 KG/M2 | DIASTOLIC BLOOD PRESSURE: 76 MMHG | WEIGHT: 178 LBS | SYSTOLIC BLOOD PRESSURE: 122 MMHG

## 2023-08-15 DIAGNOSIS — Z34.83 NORMAL PREGNANCY IN MULTIGRAVIDA IN THIRD TRIMESTER: Primary | ICD-10-CM

## 2023-08-15 DIAGNOSIS — Z3A.39 39 WEEKS GESTATION OF PREGNANCY: ICD-10-CM

## 2023-08-15 DIAGNOSIS — Z33.3 SURROGATE PREGNANCY IN THIRD TRIMESTER: ICD-10-CM

## 2023-08-15 PROCEDURE — 0502F SUBSEQUENT PRENATAL CARE: CPT | Performed by: OBSTETRICS & GYNECOLOGY

## 2023-08-15 RX ORDER — PROMETHAZINE HYDROCHLORIDE 25 MG/1
12.5 SUPPOSITORY RECTAL EVERY 6 HOURS PRN
Status: CANCELLED | OUTPATIENT
Start: 2023-08-15

## 2023-08-15 RX ORDER — OXYTOCIN/0.9 % SODIUM CHLORIDE 30/500 ML
2-20 PLASTIC BAG, INJECTION (ML) INTRAVENOUS
Status: CANCELLED | OUTPATIENT
Start: 2023-08-15

## 2023-08-15 RX ORDER — SODIUM CHLORIDE 0.9 % (FLUSH) 0.9 %
10 SYRINGE (ML) INJECTION AS NEEDED
Status: CANCELLED | OUTPATIENT
Start: 2023-08-15

## 2023-08-15 RX ORDER — IBUPROFEN 200 MG
800 TABLET ORAL EVERY 8 HOURS
Status: CANCELLED | OUTPATIENT
Start: 2023-08-15

## 2023-08-15 RX ORDER — OXYTOCIN/0.9 % SODIUM CHLORIDE 30/500 ML
999 PLASTIC BAG, INJECTION (ML) INTRAVENOUS ONCE
Status: CANCELLED | OUTPATIENT
Start: 2023-08-15 | End: 2023-08-15

## 2023-08-15 RX ORDER — METHYLERGONOVINE MALEATE 0.2 MG/ML
200 INJECTION INTRAVENOUS ONCE AS NEEDED
Status: CANCELLED | OUTPATIENT
Start: 2023-08-15

## 2023-08-15 RX ORDER — CARBOPROST TROMETHAMINE 250 UG/ML
250 INJECTION, SOLUTION INTRAMUSCULAR AS NEEDED
Status: CANCELLED | OUTPATIENT
Start: 2023-08-15

## 2023-08-15 RX ORDER — OXYTOCIN/0.9 % SODIUM CHLORIDE 30/500 ML
250 PLASTIC BAG, INJECTION (ML) INTRAVENOUS CONTINUOUS
Status: CANCELLED | OUTPATIENT
Start: 2023-08-15 | End: 2023-08-15

## 2023-08-15 RX ORDER — MISOPROSTOL 100 UG/1
800 TABLET ORAL AS NEEDED
Status: CANCELLED | OUTPATIENT
Start: 2023-08-15

## 2023-08-15 RX ORDER — SODIUM CHLORIDE 0.9 % (FLUSH) 0.9 %
10 SYRINGE (ML) INJECTION EVERY 12 HOURS SCHEDULED
Status: CANCELLED | OUTPATIENT
Start: 2023-08-15

## 2023-08-15 RX ORDER — SODIUM CHLORIDE 9 MG/ML
40 INJECTION, SOLUTION INTRAVENOUS AS NEEDED
Status: CANCELLED | OUTPATIENT
Start: 2023-08-15

## 2023-08-15 RX ORDER — ONDANSETRON 4 MG/1
4 TABLET, FILM COATED ORAL EVERY 6 HOURS PRN
Status: CANCELLED | OUTPATIENT
Start: 2023-08-15

## 2023-08-15 RX ORDER — BUTORPHANOL TARTRATE 1 MG/ML
2 INJECTION, SOLUTION INTRAMUSCULAR; INTRAVENOUS
Status: CANCELLED | OUTPATIENT
Start: 2023-08-15

## 2023-08-15 RX ORDER — ONDANSETRON 2 MG/ML
4 INJECTION INTRAMUSCULAR; INTRAVENOUS EVERY 6 HOURS PRN
Status: CANCELLED | OUTPATIENT
Start: 2023-08-15

## 2023-08-15 RX ORDER — PROMETHAZINE HYDROCHLORIDE 25 MG/1
25 TABLET ORAL EVERY 6 HOURS PRN
Status: CANCELLED | OUTPATIENT
Start: 2023-08-15

## 2023-08-15 RX ORDER — LIDOCAINE HYDROCHLORIDE 10 MG/ML
0.5 INJECTION, SOLUTION INFILTRATION; PERINEURAL ONCE AS NEEDED
Status: CANCELLED | OUTPATIENT
Start: 2023-08-15

## 2023-08-15 RX ORDER — ACETAMINOPHEN 325 MG/1
1000 TABLET ORAL EVERY 6 HOURS
Status: CANCELLED | OUTPATIENT
Start: 2023-08-15

## 2023-08-15 RX ORDER — BUTORPHANOL TARTRATE 1 MG/ML
1 INJECTION, SOLUTION INTRAMUSCULAR; INTRAVENOUS
Status: CANCELLED | OUTPATIENT
Start: 2023-08-15

## 2023-08-15 RX ORDER — SODIUM CHLORIDE, SODIUM LACTATE, POTASSIUM CHLORIDE, CALCIUM CHLORIDE 600; 310; 30; 20 MG/100ML; MG/100ML; MG/100ML; MG/100ML
125 INJECTION, SOLUTION INTRAVENOUS CONTINUOUS
Status: CANCELLED | OUTPATIENT
Start: 2023-08-15

## 2023-08-15 NOTE — H&P
[facility]  HISTORY & PHYSICAL - Obstetrics    Name: Shavonne Michael  MRN: [id number]  Location: Room/bed info not found  Date: 8/15/2023   CSN: [id number]      CHIEF COMPLAINT: EIOL    HISTORY OF PRESENT ILLNESS  Shavonne Michael is a 27 y.o.  at 39w2d who presents today for RPN.  She is scheduled for EIOL at 39w6d.  Today, denies LOF, vaginal bleeding, or contractions.  Reports good FM.    Patient denies any chest pain, palpitations, headaches, lightheadedness, shortness of breath, cough, nausea, vomiting, diarrhea, constipation, fever, or chills.    ROS  Review of Systems   Constitutional: Negative.    HENT: Negative.     Eyes: Negative.    Respiratory: Negative.     Cardiovascular: Negative.    Gastrointestinal: Negative.    Endocrine: Negative.    Genitourinary: Negative.    Musculoskeletal: Negative.    Skin: Negative.    Allergic/Immunologic: Negative.    Neurological: Negative.    Hematological: Negative.    Psychiatric/Behavioral: Negative.       PRENATAL LAB RESULTS  Prenatal labs reviewed  External Prenatal Results       Pregnancy Outside Results - Transcribed From Office Records - See Scanned Records For Details       Test Value Date Time    ABO  O  23 1044    Rh  Positive  23 1044    Antibody Screen  Negative  23 1044    Varicella IgG       Rubella  1.31 index 23 1044    Hgb  11.9 g/dL 23 0956       13.7 g/dL 23 1044    Hct  35.4 % 23 0956       40.9 % 23 1044    Glucose Fasting GTT  80 mg/dL 23 0900    Glucose Tolerance Test 1 hour  160 mg/dL 23 0959    Glucose Tolerance Test 3 hour  106 mg/dL 23 1202    Gonorrhea (discrete)  Negative  23 1044    Chlamydia (discrete)  Negative  23 1044    RPR  Non-Reactive  23 1044    VDRL       Syphilis Antibody       HBsAg  Non-Reactive  23 1044    Herpes Simplex Virus PCR       Herpes Simplex VIrus Culture       HIV  Non-Reactive   23 1044    Hep C RNA Quant PCR       Hep C Antibody  Non-Reactive  23 1044    AFP       Group B Strep  Negative  23 1624    GBS Susceptibility to Clindamycin       GBS Susceptibility to Erythromycin       Fetal Fibronectin       Genetic Testing, Maternal Blood                 Drug Screening       Test Value Date Time    Urine Drug Screen       Amphetamine Screen  Negative  23 1044    Barbiturate Screen  Negative  23 1044    Benzodiazepine Screen  Negative  23 1044    Methadone Screen  Negative  23 1044    Phencyclidine Screen  Negative  23 1044    Opiates Screen  Negative  23 1044    THC Screen  Negative  23 1044    Cocaine Screen       Propoxyphene Screen  Negative  23 1044    Buprenorphine Screen  Negative  23 1044    Methamphetamine Screen       Oxycodone Screen  Negative  23 1044    Tricyclic Antidepressants Screen  Negative  23 1044              Legend    ^: Historical                          PRENATAL RISK FACTORS  Pregnancy Problems (from 23 to present)       Problem Noted Resolved    Normal pregnancy in multigravida in third trimester 2023 by Suly Solis MD No    Surrogate pregnancy in third trimester 2023 by Sana Rea APRN No    Overview Addendum 2023 11:03 AM by Sana Rea APRN     Gestational carrier  Parents of fetus live in [place]. Parents will be arriving to [place] .  Baby Boy  Final BARRERA of 2023 by 1TUS                OB HISTORY  OB History    Para Term  AB Living   3 1 1   1 1   SAB IAB Ectopic Molar Multiple Live Births     1       1      # Outcome Date GA Lbr Conrado/2nd Weight Sex Delivery Anes PTL Lv   3 Current            2 Term 20 39w1d  3515 g (7 lb 12 oz)  Vag-Spont EPI  KAILA   1 IAB  25w0d            PAST MEDICAL HISTORY  History reviewed. No pertinent past medical history.    PAST SURGICAL HISTORY  History reviewed. No  pertinent surgical history.    FAMILY HISTORY  History reviewed. No pertinent family history.    SOCIAL HISTORY  Social History     Socioeconomic History    Marital status:    Tobacco Use    Smoking status: Never    Smokeless tobacco: Never   Vaping Use    Vaping Use: Never used   Substance and Sexual Activity    Alcohol use: Never    Drug use: Never    Sexual activity: Yes     Partners: Male     ALLERGIES  Allergies   Allergen Reactions    Penicillins Hives    Amoxicillin Hives     HOME MEDICATIONS  Prior to Admission medications    Medication Sig Start Date End Date Taking? Authorizing Provider   cetirizine (zyrTEC) 10 MG tablet Take 1 tablet by mouth Daily.   Yes Brett Burns MD   fluticasone (FLONASE) 50 MCG/ACT nasal spray    Yes ProviderBrett MD   Prenatal Vit-Fe Fumarate-FA (PRENATAL PO) Prenatal   Daily   Yes ProviderBrett MD     PHYSICAL EXAM  /76   Wt 80.7 kg (178 lb)   BMI 30.55 kg/mý   General: No acute distress. Well developed, well nourished. Pleasant.  Heart: Regular rate and rhythm. No murmurs, rubs, or gallops  Lungs: Clear to auscultation bilaterally. No wheezes, rales, or rhonchi.  Abdomen: Soft, nontender to palpation, enlarged by gravid uterus.    SVE: 2-3/ 80/ -2/ soft/ mid     IMPRESSION  Shavonne June Michael is a 27 y.o.  scheduled for EIOL at 39w6d.    PLAN  1.  IOL  - Admit: Labor and Delivery  - Attending: Dr. Solis  - Condition: Stable  - Vitals: per protocol  - Activity: ad yvonne  - Nursing: Continuous electronic fetal monitoring, as per protocol  - Diet: Clears  - IV fluids:  mL/hr  - Meds: Pitocin  - Allergies: see list  - Labs: CBC, T&S, UDS  - GBS: negative.  Antibiotics: N/A  - Shavonne Michael and I have discussed pain goals for this hospitalization after reviewing her current clinical condition, medical history and prior pain experiences.  The goal is to keep her pain level appropriate.  Patient may have epidural if  desired.  - Anticipate     This document has been electronically signed by Suly Solis MD on August 15, 2023 12:32 CDT.

## 2023-08-15 NOTE — H&P (VIEW-ONLY)
[facility]  HISTORY & PHYSICAL - Obstetrics    Name: Shavonne Michael  MRN: [id number]  Location: Room/bed info not found  Date: 8/15/2023   CSN: [id number]      CHIEF COMPLAINT: EIOL    HISTORY OF PRESENT ILLNESS  Shavonne Michael is a 27 y.o.  at 39w2d who presents today for RPN.  She is scheduled for EIOL at 39w6d.  Today, denies LOF, vaginal bleeding, or contractions.  Reports good FM.    Patient denies any chest pain, palpitations, headaches, lightheadedness, shortness of breath, cough, nausea, vomiting, diarrhea, constipation, fever, or chills.    ROS  Review of Systems   Constitutional: Negative.    HENT: Negative.     Eyes: Negative.    Respiratory: Negative.     Cardiovascular: Negative.    Gastrointestinal: Negative.    Endocrine: Negative.    Genitourinary: Negative.    Musculoskeletal: Negative.    Skin: Negative.    Allergic/Immunologic: Negative.    Neurological: Negative.    Hematological: Negative.    Psychiatric/Behavioral: Negative.       PRENATAL LAB RESULTS  Prenatal labs reviewed  External Prenatal Results       Pregnancy Outside Results - Transcribed From Office Records - See Scanned Records For Details       Test Value Date Time    ABO  O  23 1044    Rh  Positive  23 1044    Antibody Screen  Negative  23 1044    Varicella IgG       Rubella  1.31 index 23 1044    Hgb  11.9 g/dL 23 0956       13.7 g/dL 23 1044    Hct  35.4 % 23 0956       40.9 % 23 1044    Glucose Fasting GTT  80 mg/dL 23 0900    Glucose Tolerance Test 1 hour  160 mg/dL 23 0959    Glucose Tolerance Test 3 hour  106 mg/dL 23 1202    Gonorrhea (discrete)  Negative  23 1044    Chlamydia (discrete)  Negative  23 1044    RPR  Non-Reactive  23 1044    VDRL       Syphilis Antibody       HBsAg  Non-Reactive  23 1044    Herpes Simplex Virus PCR       Herpes Simplex VIrus Culture       HIV  Non-Reactive   23 1044    Hep C RNA Quant PCR       Hep C Antibody  Non-Reactive  23 1044    AFP       Group B Strep  Negative  23 1624    GBS Susceptibility to Clindamycin       GBS Susceptibility to Erythromycin       Fetal Fibronectin       Genetic Testing, Maternal Blood                 Drug Screening       Test Value Date Time    Urine Drug Screen       Amphetamine Screen  Negative  23 1044    Barbiturate Screen  Negative  23 1044    Benzodiazepine Screen  Negative  23 1044    Methadone Screen  Negative  23 1044    Phencyclidine Screen  Negative  23 1044    Opiates Screen  Negative  23 1044    THC Screen  Negative  23 1044    Cocaine Screen       Propoxyphene Screen  Negative  23 1044    Buprenorphine Screen  Negative  23 1044    Methamphetamine Screen       Oxycodone Screen  Negative  23 1044    Tricyclic Antidepressants Screen  Negative  23 1044              Legend    ^: Historical                          PRENATAL RISK FACTORS  Pregnancy Problems (from 23 to present)       Problem Noted Resolved    Normal pregnancy in multigravida in third trimester 2023 by Suly Solis MD No    Surrogate pregnancy in third trimester 2023 by Sana Rea APRN No    Overview Addendum 2023 11:03 AM by Sana Rea APRN     Gestational carrier  Parents of fetus live in [place]. Parents will be arriving to [place] .  Baby Boy  Final BARRERA of 2023 by 1TUS                OB HISTORY  OB History    Para Term  AB Living   3 1 1   1 1   SAB IAB Ectopic Molar Multiple Live Births     1       1      # Outcome Date GA Lbr Conrado/2nd Weight Sex Delivery Anes PTL Lv   3 Current            2 Term 20 39w1d  3515 g (7 lb 12 oz)  Vag-Spont EPI  KAILA   1 IAB  25w0d            PAST MEDICAL HISTORY  History reviewed. No pertinent past medical history.    PAST SURGICAL HISTORY  History reviewed. No  pertinent surgical history.    FAMILY HISTORY  History reviewed. No pertinent family history.    SOCIAL HISTORY  Social History     Socioeconomic History    Marital status:    Tobacco Use    Smoking status: Never    Smokeless tobacco: Never   Vaping Use    Vaping Use: Never used   Substance and Sexual Activity    Alcohol use: Never    Drug use: Never    Sexual activity: Yes     Partners: Male     ALLERGIES  Allergies   Allergen Reactions    Penicillins Hives    Amoxicillin Hives     HOME MEDICATIONS  Prior to Admission medications    Medication Sig Start Date End Date Taking? Authorizing Provider   cetirizine (zyrTEC) 10 MG tablet Take 1 tablet by mouth Daily.   Yes Brett Burns MD   fluticasone (FLONASE) 50 MCG/ACT nasal spray    Yes ProviderBrett MD   Prenatal Vit-Fe Fumarate-FA (PRENATAL PO) Prenatal   Daily   Yes ProviderBrett MD     PHYSICAL EXAM  /76   Wt 80.7 kg (178 lb)   BMI 30.55 kg/mý   General: No acute distress. Well developed, well nourished. Pleasant.  Heart: Regular rate and rhythm. No murmurs, rubs, or gallops  Lungs: Clear to auscultation bilaterally. No wheezes, rales, or rhonchi.  Abdomen: Soft, nontender to palpation, enlarged by gravid uterus.    SVE: 2-3/ 80/ -2/ soft/ mid     IMPRESSION  Shavonne June Michael is a 27 y.o.  scheduled for EIOL at 39w6d.    PLAN  1.  IOL  - Admit: Labor and Delivery  - Attending: Dr. Solis  - Condition: Stable  - Vitals: per protocol  - Activity: ad yvonne  - Nursing: Continuous electronic fetal monitoring, as per protocol  - Diet: Clears  - IV fluids:  mL/hr  - Meds: Pitocin  - Allergies: see list  - Labs: CBC, T&S, UDS  - GBS: negative.  Antibiotics: N/A  - Shavonne Michael and I have discussed pain goals for this hospitalization after reviewing her current clinical condition, medical history and prior pain experiences.  The goal is to keep her pain level appropriate.  Patient may have epidural if  desired.  - Anticipate     This document has been electronically signed by Suly Solis MD on August 15, 2023 12:32 CDT.

## 2023-08-15 NOTE — PROGRESS NOTES
CC: Prenatal visit    Shavonne Michael is a 27 y.o.  at 39w2d.  She is here today with the adoptive parents from [place], a friend/, and the surrogacy advocate.  Doing well.  Denies contractions, LOF, or VB.  Reports good FM.    /76   Wt 80.7 kg (178 lb)   BMI 30.55 kg/mý   SVE: 2-3/80/-2/soft/mid, vertex     Fetal Heart Rate: 150    Prelim US- BPP 8, MARIA ELENA 8.78 cm, cephalic, placenta posterior    Pregnancy Problems (from 23 to present)       Problem Noted Resolved    Normal pregnancy in multigravida in third trimester 2023 by Suly Solis MD No    Surrogate pregnancy in third trimester 2023 by Sana Rea APRN No    Overview Addendum 2023 11:03 AM by Sana Rea APRN     Gestational carrier  Parents of fetus live in [place]. Parents will be arriving to [place] .  Baby Boy  Final BARRERA of 2023 by 1TUS                A/P: Shavonne Michael is a 27 y.o.  at 39w2d.  - Requested to move up IOL for adoptive parents ( was actually able to make it from [place] but needs to return for work).  IOL moved to , [facility].     Diagnosis Plan   1. Normal pregnancy in multigravida in third trimester        2. Surrogate pregnancy in third trimester        3. 39 weeks gestation of pregnancy          Suly Solis MD  8/15/2023  12:30 CDT

## 2023-08-19 ENCOUNTER — ANESTHESIA EVENT (OUTPATIENT)
Dept: LABOR AND DELIVERY | Facility: HOSPITAL | Age: 28
End: 2023-08-19
Payer: COMMERCIAL

## 2023-08-19 ENCOUNTER — HOSPITAL ENCOUNTER (INPATIENT)
Facility: HOSPITAL | Age: 28
LOS: 1 days | Discharge: HOME | End: 2023-08-20
Attending: OBSTETRICS & GYNECOLOGY | Admitting: OBSTETRICS & GYNECOLOGY
Payer: COMMERCIAL

## 2023-08-19 ENCOUNTER — ANESTHESIA (OUTPATIENT)
Dept: LABOR AND DELIVERY | Facility: HOSPITAL | Age: 28
End: 2023-08-19
Payer: COMMERCIAL

## 2023-08-19 DIAGNOSIS — Z34.83 NORMAL PREGNANCY IN MULTIGRAVIDA IN THIRD TRIMESTER: ICD-10-CM

## 2023-08-19 PROBLEM — Z34.90 ENCOUNTER FOR ELECTIVE INDUCTION OF LABOR: Status: ACTIVE | Noted: 2023-08-19

## 2023-08-19 LAB
ABO GROUP BLD: NORMAL
AMPHET+METHAMPHET UR QL: NEGATIVE
AMPHETAMINES UR QL: NEGATIVE
BARBITURATES UR QL SCN: NEGATIVE
BENZODIAZ UR QL SCN: NEGATIVE
BLD GP AB SCN SERPL QL: NEGATIVE
BUPRENORPHINE SERPL-MCNC: NEGATIVE NG/ML
CANNABINOIDS SERPL QL: NEGATIVE
COCAINE UR QL: NEGATIVE
DEPRECATED RDW RBC AUTO: 42.2 FL (ref 37–54)
ERYTHROCYTE [DISTWIDTH] IN BLOOD BY AUTOMATED COUNT: 15.5 % (ref 12.3–15.4)
FENTANYL UR-MCNC: NEGATIVE NG/ML
HCT VFR BLD AUTO: 37.6 % (ref 34–46.6)
HGB BLD-MCNC: 12.1 G/DL (ref 12–15.9)
Lab: NORMAL
MCH RBC QN AUTO: 24.7 PG (ref 26.6–33)
MCHC RBC AUTO-ENTMCNC: 32.2 G/DL (ref 31.5–35.7)
MCV RBC AUTO: 76.9 FL (ref 79–97)
METHADONE UR QL SCN: NEGATIVE
OPIATES UR QL: NEGATIVE
OXYCODONE UR QL SCN: NEGATIVE
PCP UR QL SCN: NEGATIVE
PLATELET # BLD AUTO: 192 10*3/MM3 (ref 140–450)
PMV BLD AUTO: 11.9 FL (ref 6–12)
PROPOXYPH UR QL: NEGATIVE
RBC # BLD AUTO: 4.89 10*6/MM3 (ref 3.77–5.28)
RH BLD: POSITIVE
T&S EXPIRATION DATE: NORMAL
TRICYCLICS UR QL SCN: NEGATIVE
WBC NRBC COR # BLD: 8.51 10*3/MM3 (ref 3.4–10.8)

## 2023-08-19 PROCEDURE — C1755 CATHETER, INTRASPINAL: HCPCS

## 2023-08-19 PROCEDURE — 86901 BLOOD TYPING SEROLOGIC RH(D): CPT | Performed by: OBSTETRICS & GYNECOLOGY

## 2023-08-19 PROCEDURE — 80307 DRUG TEST PRSMV CHEM ANLYZR: CPT | Performed by: OBSTETRICS & GYNECOLOGY

## 2023-08-19 PROCEDURE — 51702 INSERT TEMP BLADDER CATH: CPT

## 2023-08-19 PROCEDURE — C1755 CATHETER, INTRASPINAL: HCPCS | Performed by: NURSE ANESTHETIST, CERTIFIED REGISTERED

## 2023-08-19 PROCEDURE — 86850 RBC ANTIBODY SCREEN: CPT | Performed by: OBSTETRICS & GYNECOLOGY

## 2023-08-19 PROCEDURE — 59400 OBSTETRICAL CARE: CPT | Performed by: OBSTETRICS & GYNECOLOGY

## 2023-08-19 PROCEDURE — 25010000002 FENTANYL CITRATE (PF) 250 MCG/5ML SOLUTION: Performed by: NURSE ANESTHETIST, CERTIFIED REGISTERED

## 2023-08-19 PROCEDURE — 10907ZC DRAINAGE OF AMNIOTIC FLUID, THERAPEUTIC FROM PRODUCTS OF CONCEPTION, VIA NATURAL OR ARTIFICIAL OPENING: ICD-10-PCS | Performed by: OBSTETRICS & GYNECOLOGY

## 2023-08-19 PROCEDURE — 85027 COMPLETE CBC AUTOMATED: CPT | Performed by: OBSTETRICS & GYNECOLOGY

## 2023-08-19 PROCEDURE — 86900 BLOOD TYPING SEROLOGIC ABO: CPT | Performed by: OBSTETRICS & GYNECOLOGY

## 2023-08-19 RX ORDER — BUTORPHANOL TARTRATE 1 MG/ML
1 INJECTION, SOLUTION INTRAMUSCULAR; INTRAVENOUS
Status: DISCONTINUED | OUTPATIENT
Start: 2023-08-19 | End: 2023-08-19

## 2023-08-19 RX ORDER — ACETAMINOPHEN 500 MG
1000 TABLET ORAL EVERY 6 HOURS
Status: DISCONTINUED | OUTPATIENT
Start: 2023-08-19 | End: 2023-08-19 | Stop reason: HOSPADM

## 2023-08-19 RX ORDER — SODIUM CHLORIDE 0.9 % (FLUSH) 0.9 %
1-10 SYRINGE (ML) INJECTION AS NEEDED
Status: DISCONTINUED | OUTPATIENT
Start: 2023-08-19 | End: 2023-08-20 | Stop reason: HOSPADM

## 2023-08-19 RX ORDER — SODIUM CHLORIDE 9 MG/ML
40 INJECTION, SOLUTION INTRAVENOUS AS NEEDED
Status: DISCONTINUED | OUTPATIENT
Start: 2023-08-19 | End: 2023-08-19 | Stop reason: HOSPADM

## 2023-08-19 RX ORDER — ACETAMINOPHEN 325 MG/1
650 TABLET ORAL EVERY 6 HOURS
Qty: 30 TABLET | Refills: 1 | Status: SHIPPED | OUTPATIENT
Start: 2023-08-20

## 2023-08-19 RX ORDER — ACETAMINOPHEN 325 MG/1
650 TABLET ORAL EVERY 6 HOURS
Status: DISCONTINUED | OUTPATIENT
Start: 2023-08-19 | End: 2023-08-20 | Stop reason: HOSPADM

## 2023-08-19 RX ORDER — BISACODYL 10 MG
10 SUPPOSITORY, RECTAL RECTAL DAILY PRN
Status: DISCONTINUED | OUTPATIENT
Start: 2023-08-20 | End: 2023-08-20 | Stop reason: HOSPADM

## 2023-08-19 RX ORDER — MISOPROSTOL 200 UG/1
800 TABLET ORAL AS NEEDED
Status: DISCONTINUED | OUTPATIENT
Start: 2023-08-19 | End: 2023-08-19 | Stop reason: HOSPADM

## 2023-08-19 RX ORDER — PROMETHAZINE HYDROCHLORIDE 12.5 MG/1
12.5 SUPPOSITORY RECTAL EVERY 6 HOURS PRN
Status: DISCONTINUED | OUTPATIENT
Start: 2023-08-19 | End: 2023-08-19 | Stop reason: HOSPADM

## 2023-08-19 RX ORDER — LIDOCAINE HYDROCHLORIDE AND EPINEPHRINE 15; 5 MG/ML; UG/ML
INJECTION, SOLUTION EPIDURAL AS NEEDED
Status: DISCONTINUED | OUTPATIENT
Start: 2023-08-19 | End: 2023-08-19 | Stop reason: SURG

## 2023-08-19 RX ORDER — ONDANSETRON 4 MG/1
4 TABLET, FILM COATED ORAL EVERY 6 HOURS PRN
Status: DISCONTINUED | OUTPATIENT
Start: 2023-08-19 | End: 2023-08-20 | Stop reason: HOSPADM

## 2023-08-19 RX ORDER — LIDOCAINE HYDROCHLORIDE 10 MG/ML
0.5 INJECTION, SOLUTION INFILTRATION; PERINEURAL ONCE AS NEEDED
Status: DISCONTINUED | OUTPATIENT
Start: 2023-08-19 | End: 2023-08-19 | Stop reason: HOSPADM

## 2023-08-19 RX ORDER — HYDROCORTISONE 25 MG/G
1 CREAM TOPICAL AS NEEDED
Status: DISCONTINUED | OUTPATIENT
Start: 2023-08-19 | End: 2023-08-20 | Stop reason: HOSPADM

## 2023-08-19 RX ORDER — CARBOPROST TROMETHAMINE 250 UG/ML
250 INJECTION, SOLUTION INTRAMUSCULAR AS NEEDED
Status: DISCONTINUED | OUTPATIENT
Start: 2023-08-19 | End: 2023-08-19 | Stop reason: HOSPADM

## 2023-08-19 RX ORDER — ONDANSETRON 2 MG/ML
4 INJECTION INTRAMUSCULAR; INTRAVENOUS EVERY 6 HOURS PRN
Status: DISCONTINUED | OUTPATIENT
Start: 2023-08-19 | End: 2023-08-19 | Stop reason: HOSPADM

## 2023-08-19 RX ORDER — FERROUS SULFATE TAB EC 324 MG (65 MG FE EQUIVALENT) 324 (65 FE) MG
324 TABLET DELAYED RESPONSE ORAL 2 TIMES DAILY WITH MEALS
Status: DISCONTINUED | OUTPATIENT
Start: 2023-08-19 | End: 2023-08-20 | Stop reason: HOSPADM

## 2023-08-19 RX ORDER — PROMETHAZINE HYDROCHLORIDE 25 MG/1
25 TABLET ORAL EVERY 6 HOURS PRN
Status: DISCONTINUED | OUTPATIENT
Start: 2023-08-19 | End: 2023-08-19 | Stop reason: HOSPADM

## 2023-08-19 RX ORDER — IBUPROFEN 600 MG/1
600 TABLET ORAL EVERY 6 HOURS SCHEDULED
Status: DISCONTINUED | OUTPATIENT
Start: 2023-08-20 | End: 2023-08-20 | Stop reason: HOSPADM

## 2023-08-19 RX ORDER — OXYTOCIN/0.9 % SODIUM CHLORIDE 30/500 ML
999 PLASTIC BAG, INJECTION (ML) INTRAVENOUS ONCE
Status: DISCONTINUED | OUTPATIENT
Start: 2023-08-19 | End: 2023-08-19 | Stop reason: HOSPADM

## 2023-08-19 RX ORDER — SODIUM CHLORIDE 0.9 % (FLUSH) 0.9 %
10 SYRINGE (ML) INJECTION AS NEEDED
Status: DISCONTINUED | OUTPATIENT
Start: 2023-08-19 | End: 2023-08-19 | Stop reason: HOSPADM

## 2023-08-19 RX ORDER — ONDANSETRON 4 MG/1
4 TABLET, FILM COATED ORAL EVERY 6 HOURS PRN
Status: DISCONTINUED | OUTPATIENT
Start: 2023-08-19 | End: 2023-08-19 | Stop reason: HOSPADM

## 2023-08-19 RX ORDER — IBUPROFEN 800 MG/1
800 TABLET ORAL EVERY 8 HOURS
Status: DISCONTINUED | OUTPATIENT
Start: 2023-08-19 | End: 2023-08-19 | Stop reason: HOSPADM

## 2023-08-19 RX ORDER — FENTANYL CITRATE 50 UG/ML
INJECTION, SOLUTION INTRAMUSCULAR; INTRAVENOUS AS NEEDED
Status: DISCONTINUED | OUTPATIENT
Start: 2023-08-19 | End: 2023-08-19 | Stop reason: SURG

## 2023-08-19 RX ORDER — PRENATAL VIT/IRON FUM/FOLIC AC 27MG-0.8MG
1 TABLET ORAL DAILY
Status: DISCONTINUED | OUTPATIENT
Start: 2023-08-20 | End: 2023-08-20 | Stop reason: HOSPADM

## 2023-08-19 RX ORDER — FENTANYL CITRATE 50 UG/ML
100 INJECTION, SOLUTION INTRAMUSCULAR; INTRAVENOUS
Status: DISCONTINUED | OUTPATIENT
Start: 2023-08-19 | End: 2023-08-19

## 2023-08-19 RX ORDER — METHYLERGONOVINE MALEATE 0.2 MG/ML
200 INJECTION INTRAVENOUS ONCE AS NEEDED
Status: DISCONTINUED | OUTPATIENT
Start: 2023-08-19 | End: 2023-08-19 | Stop reason: HOSPADM

## 2023-08-19 RX ORDER — DOCUSATE SODIUM 100 MG/1
100 CAPSULE, LIQUID FILLED ORAL 2 TIMES DAILY
Status: DISCONTINUED | OUTPATIENT
Start: 2023-08-19 | End: 2023-08-20 | Stop reason: HOSPADM

## 2023-08-19 RX ORDER — EPHEDRINE SULFATE 50 MG/ML
10 INJECTION, SOLUTION INTRAVENOUS
Status: DISCONTINUED | OUTPATIENT
Start: 2023-08-19 | End: 2023-08-19 | Stop reason: HOSPADM

## 2023-08-19 RX ORDER — FENTANYL CITRATE 50 UG/ML
50 INJECTION, SOLUTION INTRAMUSCULAR; INTRAVENOUS
Status: DISCONTINUED | OUTPATIENT
Start: 2023-08-19 | End: 2023-08-19

## 2023-08-19 RX ORDER — CALCIUM CARBONATE 500 MG/1
2 TABLET, CHEWABLE ORAL 3 TIMES DAILY PRN
Status: DISCONTINUED | OUTPATIENT
Start: 2023-08-19 | End: 2023-08-20 | Stop reason: HOSPADM

## 2023-08-19 RX ORDER — MISOPROSTOL 200 UG/1
1000 TABLET ORAL ONCE
Status: COMPLETED | OUTPATIENT
Start: 2023-08-19 | End: 2023-08-19

## 2023-08-19 RX ORDER — SODIUM CHLORIDE, SODIUM LACTATE, POTASSIUM CHLORIDE, CALCIUM CHLORIDE 600; 310; 30; 20 MG/100ML; MG/100ML; MG/100ML; MG/100ML
125 INJECTION, SOLUTION INTRAVENOUS CONTINUOUS
Status: DISCONTINUED | OUTPATIENT
Start: 2023-08-19 | End: 2023-08-19

## 2023-08-19 RX ORDER — IBUPROFEN 600 MG/1
600 TABLET ORAL EVERY 6 HOURS SCHEDULED
Qty: 30 TABLET | Refills: 1 | Status: SHIPPED | OUTPATIENT
Start: 2023-08-20

## 2023-08-19 RX ORDER — SODIUM CHLORIDE 0.9 % (FLUSH) 0.9 %
10 SYRINGE (ML) INJECTION EVERY 12 HOURS SCHEDULED
Status: DISCONTINUED | OUTPATIENT
Start: 2023-08-19 | End: 2023-08-19 | Stop reason: HOSPADM

## 2023-08-19 RX ORDER — ONDANSETRON 2 MG/ML
4 INJECTION INTRAMUSCULAR; INTRAVENOUS EVERY 6 HOURS PRN
Status: DISCONTINUED | OUTPATIENT
Start: 2023-08-19 | End: 2023-08-20 | Stop reason: HOSPADM

## 2023-08-19 RX ORDER — OXYTOCIN/0.9 % SODIUM CHLORIDE 30/500 ML
2-20 PLASTIC BAG, INJECTION (ML) INTRAVENOUS
Status: DISCONTINUED | OUTPATIENT
Start: 2023-08-19 | End: 2023-08-19 | Stop reason: HOSPADM

## 2023-08-19 RX ORDER — OXYTOCIN/0.9 % SODIUM CHLORIDE 30/500 ML
250 PLASTIC BAG, INJECTION (ML) INTRAVENOUS CONTINUOUS
Status: DISPENSED | OUTPATIENT
Start: 2023-08-19 | End: 2023-08-19

## 2023-08-19 RX ADMIN — LIDOCAINE HYDROCHLORIDE AND EPINEPHRINE 3 ML: 15; 5 INJECTION, SOLUTION EPIDURAL at 14:33

## 2023-08-19 RX ADMIN — Medication 2 MILLI-UNITS/MIN: at 09:16

## 2023-08-19 RX ADMIN — Medication 250 ML/HR: at 17:01

## 2023-08-19 RX ADMIN — SODIUM CHLORIDE, POTASSIUM CHLORIDE, SODIUM LACTATE AND CALCIUM CHLORIDE 125 ML/HR: 600; 310; 30; 20 INJECTION, SOLUTION INTRAVENOUS at 07:05

## 2023-08-19 RX ADMIN — FERROUS SULFATE TAB EC 324 MG (65 MG FE EQUIVALENT) 324 MG: 324 (65 FE) TABLET DELAYED RESPONSE at 23:41

## 2023-08-19 RX ADMIN — IBUPROFEN 600 MG: 600 TABLET, FILM COATED ORAL at 23:41

## 2023-08-19 RX ADMIN — MISOPROSTOL 1000 MCG: 200 TABLET ORAL at 16:37

## 2023-08-19 RX ADMIN — FENTANYL CITRATE 250 MCG: 50 INJECTION, SOLUTION INTRAMUSCULAR; INTRAVENOUS at 14:38

## 2023-08-19 RX ADMIN — DOCUSATE SODIUM 100 MG: 100 CAPSULE, LIQUID FILLED ORAL at 20:15

## 2023-08-19 RX ADMIN — SODIUM CHLORIDE, POTASSIUM CHLORIDE, SODIUM LACTATE AND CALCIUM CHLORIDE 1000 ML: 600; 310; 30; 20 INJECTION, SOLUTION INTRAVENOUS at 14:20

## 2023-08-19 RX ADMIN — ACETAMINOPHEN 650 MG: 325 TABLET, FILM COATED ORAL at 20:15

## 2023-08-19 NOTE — INTERVAL H&P NOTE
H&P reviewed. The patient was examined and there are no changes to the H&P. SVE per RN 2-3 cm. Pitocin started. GBS negative. May have epidural if desired.    This document has been electronically signed by Suly Solis MD on August 19, 2023 10:11 CDT.

## 2023-08-19 NOTE — ANESTHESIA PROCEDURE NOTES
Labor Epidural      Patient reassessed immediately prior to procedure    Patient location during procedure: OB  Performed By  CRNA/MIMA: Arash Cooper CRNA  Preanesthetic Checklist  Completed: patient identified, IV checked, site marked, risks and benefits discussed, surgical consent, monitors and equipment checked, pre-op evaluation and timeout performed  Prep:  Pt Position:sitting  Sterile Tech:gloves, cap, gown, mask and sterile barrier  Prep:DuraPrep  Monitoring:blood pressure monitoring and continuous pulse oximetry  Epidural Block Procedure:  Approach:midline  Guidance:landmark technique and palpation technique  Location:L3-L4  Needle Type:Tuohy  Needle Gauge:17 G  Loss of Resistance Medium: saline  Loss of Resistance: 7cm  Cath Depth at skin:12 cm  Paresthesia: none  Aspiration:negative  Test Dose:negative  Number of Attempts: 1  Post Assessment:  Dressing:occlusive dressing applied and secured with tape  Pt Tolerance:patient tolerated the procedure well with no apparent complications  Complications:no

## 2023-08-19 NOTE — L&D DELIVERY NOTE
[facility]  Vaginal Delivery Note    Patient Name: Shavonne Michael  : 1995  MRN: [id number]  Date of Delivery: 2023     Diagnosis     Pre & Post-Delivery:  Intrauterine pregnancy at 39w6d  Labor status: Induced Onset of Labor     Encounter for elective induction of labor    Surrogate pregnancy in third trimester    Normal pregnancy in multigravida in third trimester    Single liveborn infant delivered vaginally             Problem List    Transfer to Postpartum     Review the Delivery Report for details.     Delivery     Delivery: Vaginal, Spontaneous     YOB: 2021    Time of Birth:  Gestational Age 4:18 PM   39w6d     Anesthesia: Epidural     Delivering clinician: Suly Solis    Forceps?   No   Vacuum? No    Shoulder dystocia present: No        Delivery narrative:    Patient presented today on  at 39w6d for EIOL with surrogate pregnancy; she was 2-3 cm on admission and pitocin was started.  She received an epidural when she was about 6 cm.  She was found to be 9 cm at 3:36PM and had an amniotomy for clear fluid.  At 3:55PM she began pushing.  Initially the baby was ROP but with traction and maternal pushing, rotated to JOSE DE JESUS.  Patient pushed to deliver a viable 3600g male from the JOSE DE JESUS position over an intact perineum via  under epidural anesthesia on 2023 at 4:18PM.  No noted nuchal cord.  Left anterior shoulder was delivered with ease, followed by right posterior shoulder.  Body was then delivered with gentle traction.  Mouth and nose bulb suctioned.  3 vessel cord clamped x2 and cut after 30 seconds of delayed clamping.  Baby was handed off to awaiting nursery team.  Cord blood was obtained and sent.  Placenta delivered spontaneously intact and Pitocin was started.  Cervix, vagina, and perineum were examined and no laceration was noted.  She reported at this time that after her last delivery she had some extra bleeding; she  was given 1000 mcg rectal Cytotec prophylatically.   mL; QBL pending, please see nursing documentation.  Apgar scores 8/9.  Mother and infant stable.      Infant     Findings: male  infant     Infant observations: Weight: 3600 g (7 lb 15 oz)   Length: 20  in  Observations/Comments:        Apgars: 8  @ 1 minute /    9  @ 5 minutes   Infant Name: Saleem Robertson (baby was adopted by Chinese couple)     Placenta & Cord         Placenta delivered  Spontaneous  at        Cord: 3 vessels  present.   Nuchal Cord?  no   Cord blood obtained: Yes    Cord gases obtained:  No    Cord gas results: Venous:  No results found for: PHCVEN    Arterial:  No results found for: PHCART     Repair     Episiotomy: None    No    Lacerations: No   Estimated Blood Loss: 150 mL     Quantitative Blood Loss:          Complications     none    Disposition     Mother to Mother Baby/Postpartum in stable condition currently.  Baby to remains with mom in stable condition currently.    Suly Solis MD  23  16:38 CDT

## 2023-08-19 NOTE — ANESTHESIA PREPROCEDURE EVALUATION
Anesthesia Evaluation     NPO Solid Status: > 6 hours  NPO Liquid Status: < 2 hours           Airway   Mallampati: II  TM distance: >3 FB  Neck ROM: full  no difficulty expected  Dental - normal exam     Pulmonary - normal exam   Cardiovascular - normal exam        Neuro/Psych  GI/Hepatic/Renal/Endo      Musculoskeletal     Abdominal    Substance History      OB/GYN    (+) Pregnant        Other                      Anesthesia Plan    ASA 2     epidural       Anesthetic plan, risks, benefits, and alternatives have been provided, discussed and informed consent has been obtained with: patient.    CODE STATUS:    Code Status (Patient has no pulse and is not breathing): CPR (Attempt to Resuscitate)  Medical Interventions (Patient has pulse or is breathing): Full

## 2023-08-20 VITALS
WEIGHT: 178 LBS | OXYGEN SATURATION: 98 % | RESPIRATION RATE: 16 BRPM | HEART RATE: 96 BPM | BODY MASS INDEX: 30.39 KG/M2 | SYSTOLIC BLOOD PRESSURE: 127 MMHG | TEMPERATURE: 97.5 F | HEIGHT: 64 IN | DIASTOLIC BLOOD PRESSURE: 75 MMHG

## 2023-08-20 PROCEDURE — 0503F POSTPARTUM CARE VISIT: CPT | Performed by: OBSTETRICS & GYNECOLOGY

## 2023-08-20 RX ADMIN — ACETAMINOPHEN 650 MG: 325 TABLET, FILM COATED ORAL at 10:06

## 2023-08-20 RX ADMIN — DOCUSATE SODIUM 100 MG: 100 CAPSULE, LIQUID FILLED ORAL at 08:36

## 2023-08-20 RX ADMIN — IBUPROFEN 600 MG: 600 TABLET, FILM COATED ORAL at 06:35

## 2023-08-20 RX ADMIN — PRENATAL VIT W/ FE FUMARATE-FA TAB 27-0.8 MG 1 TABLET: 27-0.8 TAB at 08:36

## 2023-08-20 RX ADMIN — ACETAMINOPHEN 650 MG: 325 TABLET, FILM COATED ORAL at 04:28

## 2023-08-20 RX ADMIN — IBUPROFEN 600 MG: 600 TABLET, FILM COATED ORAL at 14:20

## 2023-08-20 RX ADMIN — FERROUS SULFATE TAB EC 324 MG (65 MG FE EQUIVALENT) 324 MG: 324 (65 FE) TABLET DELAYED RESPONSE at 08:36

## 2023-08-20 NOTE — PLAN OF CARE
Problem: Adult Inpatient Plan of Care  Goal: Plan of Care Review  Outcome: Ongoing, Progressing  Flowsheets (Taken 8/20/2023 0431)  Plan of Care Reviewed With: patient  Outcome Evaluation: VSS, pt able to void, pain well controlled with scheduled meds, tolerating po intake. pt anticipating being d/c'd home tomorrow.   Goal Outcome Evaluation:  Plan of Care Reviewed With: patient           Outcome Evaluation: VSS, pt able to void, pain well controlled with scheduled meds, tolerating po intake. pt anticipating being d/c'd home tomorrow.

## 2023-08-20 NOTE — DISCHARGE INSTR - APPOINTMENTS
Follow Up appointments in 2 weeks and in 6 weeks. Office will call with date/times of appointments.  [phone]

## 2023-08-20 NOTE — PROGRESS NOTES
"[facility]  Progress Note - Obstetrics    Name: Shavonne Michael  MRN: [id number]  Location:   Date: 2023  CSN: [id number]       PPD #1 s/p  at 39w6d secondary to EIOL. According to patient, she had some extra bleeding after delivery with her last pregnancy. Rectal Cytotec given prophylactically      Patient seen and examined.  Denies headache, dizziness, chest pain, shortness of breath, nausea, vomiting.  Minimal/moderate lochia.  OOB and ambulating.  Tolerating regular diet. Has passed gas. Has had a bowel movement. Voiding without difficulty. Surrogate.     PHYSICAL EXAM  /56 (BP Location: Left arm, Patient Position: Lying)   Pulse 67   Temp 97.8 øF (36.6 øC) (Oral)   Resp 16   Ht 162.6 cm (64\")   Wt 80.7 kg (178 lb)   SpO2 97%   Breastfeeding No   BMI 30.55 kg/mý   General: AAOx3, no apparent distress.  Chest: no visible signs of respiratory distress    Intake/Output Summary (Last 24 hours) at 2023 0759  Last data filed at 2023 1833  Gross per 24 hour   Intake --   Output 298 ml   Net -298 ml       IMPRESSION  Shavonne Michael is a 27 y.o.  PPD #1 s/p  at 39w6d secondary to EIOL.  Doing well and recovering appropriately. Surrogate.     PLAN  1.  Postpartum s/p   - Continue routine postpartum care.  - Diet: Pregnancy  - DVT prophylaxis: continue to ambulate  - Contraception: doesn't know. Pt states \"I'm done having kids of my own, the surrogacy agency will put me on birth control\".  - Discharge to home today.  Follow-up in 2 weeks (telephone visit), 6 weeks.     [facility]  [address] [place] [address]  Office: [phone]  This document has been electronically signed by Kenna Pedro Jasmin, MD on 2023 08:01 CDT      "

## 2023-08-20 NOTE — DISCHARGE SUMMARY
[facility]  Shavonne Michael  : 1995  MRN: [id number]  CSN: [id number]    Discharge Summary    Date of Admission: 2023  Date of Discharge 2023  Principle Discharge Dx:     Type of Vaginal Delivery:  Spontaneous Vaginal Delivery    Brief History: The patient is a 27 y.o., now , who presented to labor and delivery for elective induction of labor    Hospital Course: The patient was admitted for induction of labor.  This was a surrogate pregnancy. Her labor was unremarkable . She underwent a  Spontaneous Vaginal Delivery of a viable male infant at , weighing 3600 g, with Apgars of 8 and 9. She had no lacerations. Her post-partum course has been unremarkable. She is discharged home to her own care.  Discharge instructions were given.  Activity as tolerated  Diet as tolerated    Discharge Medications:      Your medication list        START taking these medications        Instructions Last Dose Given Next Dose Due   acetaminophen 325 MG tablet  Commonly known as: TYLENOL      Take 2 tablets by mouth Every 6 (Six) Hours.       ibuprofen 600 MG tablet  Commonly known as: ADVIL,MOTRIN      Take 1 tablet by mouth Every 6 (Six) Hours.              CONTINUE taking these medications        Instructions Last Dose Given Next Dose Due   cetirizine 10 MG tablet  Commonly known as: zyrTEC      Take 1 tablet by mouth Daily.       fluticasone 50 MCG/ACT nasal spray  Commonly known as: FLONASE                  STOP taking these medications      PRENATAL PO                  Where to Get Your Medications        These medications were sent to [id number] - [place], [place] - [address] [place] [address] AT SEC OF [facility] - [phone]  - [phone] FX  [address] [place] [id number]      Phone: [phone]   acetaminophen 325 MG tablet  ibuprofen 600 MG tablet       Discharge Disposition: Home  Follow up: No future appointments.  Condition on  discharge: stable            This document has been electronically signed by Srinivasa Mcgraw MD on August 20, 2023 07:55 CDT

## 2023-08-20 NOTE — DISCHARGE INSTRUCTIONS
Notify Physician or CNM of heavy bleeding, passing clots or foul odor to your discharge  Temp above 100.4. Burning on urination, gapping or drainage from incision or episiotomy, pain not relieved by your pain meds, redness or streaking in your breast or pain in your legs.  Take medication as prescribed  Continue taking your iron or vitamins till your refills run out or as long as you are breastfeeding  Take several rest periods during the day  Baby blues are normal, and may be present around the 3rd-4th day, if they last longer than 2-3 days contact your physician.  Pelvic Rest- No douching tampons or intercourse for 6 weeks  No lifting anything heavier than the baby  Wear a good supportive bra 24 hrs a day to prevent engorgement.  No driving the 1st 2 weeks or as long as you are taking pain meds

## 2023-08-21 NOTE — PAYOR COMM NOTE
"[facility]yl[facility]  [facility]  Case Management Extender  [phone] phone  [phone] fax    NPI [id number]  Tax ID [id number]    Shavonne Dutta (27 y.o. Female)       Date of Birth   1995    Social Security Number       Address   [address] [place] [address]    Home Phone   [phone]    MRN   [id number]       Mosque   Unknown    Marital Status                               Admission Date   8/19/23    Admission Type   Elective    Admitting Provider   Suly Solis MD    Attending Provider       Department, Room/Bed   [facility] MOTHER BABY, M765/1       Discharge Date   8/20/2023    Discharge Disposition   Home or Self Care    Discharge Destination                                 Attending Provider: (none)   Allergies: Penicillins, Amoxicillin    Isolation: None   Infection: None   Code Status: Prior    Ht: 162.6 cm (64\")   Wt: 80.7 kg (178 lb)    Admission Cmt: None   Principal Problem: Encounter for elective induction of labor [Z34.90]                   Active Insurance as of 8/19/2023       Primary Coverage       Payor Plan Insurance Group Employer/Plan Group    [employer] [facility]       Payor Plan Address Payor Plan Phone Number Payor Plan Fax Number Effective Dates    PO    1/16/2023 - None Entered    [facility] [id number]         Subscriber Name Subscriber Birth Date Member ID       SHAVONNE DUTTA 1995 [id number]                     Emergency Contacts        (Rel.) Home Phone Work Phone Mobile Phone    MORALESBETZAIDA MONTENEGRO (Spouse) [phone] -- --                 History & Physical        Suly Solis MD at 08/19/23 1011          H&P reviewed. The patient was examined and there are no changes to the H&P. SVE per RN 2-3 cm. Pitocin started. GBS negative. May have epidural if desired.    This document has been electronically " signed by Suly Solis MD on 2023 10:11 CDT.    Electronically signed by Suly Solis MD at 23 1011   Source Note          [facility]  HISTORY & PHYSICAL - Obstetrics    Name: Shavonne Michael  MRN: [id number]  Location: Room/bed info not found  Date: 8/15/2023   CSN: [id number]      CHIEF COMPLAINT: EIOL    HISTORY OF PRESENT ILLNESS  Shavonne Michael is a 27 y.o.  at 39w2d who presents today for RPN.  She is scheduled for EIOL at 39w6d.  Today, denies LOF, vaginal bleeding, or contractions.  Reports good FM.    Patient denies any chest pain, palpitations, headaches, lightheadedness, shortness of breath, cough, nausea, vomiting, diarrhea, constipation, fever, or chills.    ROS  Review of Systems   Constitutional: Negative.    HENT: Negative.     Eyes: Negative.    Respiratory: Negative.     Cardiovascular: Negative.    Gastrointestinal: Negative.    Endocrine: Negative.    Genitourinary: Negative.    Musculoskeletal: Negative.    Skin: Negative.    Allergic/Immunologic: Negative.    Neurological: Negative.    Hematological: Negative.    Psychiatric/Behavioral: Negative.       PRENATAL LAB RESULTS  Prenatal labs reviewed  External Prenatal Results       Pregnancy Outside Results - Transcribed From Office Records - See Scanned Records For Details       Test Value Date Time    ABO  O  23 1044    Rh  Positive  23 1044    Antibody Screen  Negative  23 1044    Varicella IgG       Rubella  1.31 index 23 1044    Hgb  11.9 g/dL 23 0956       13.7 g/dL 23 1044    Hct  35.4 % 23 0956       40.9 % 23 1044    Glucose Fasting GTT  80 mg/dL 23 0900    Glucose Tolerance Test 1 hour  160 mg/dL 23 0959    Glucose Tolerance Test 3 hour  106 mg/dL 23 1202    Gonorrhea (discrete)  Negative  23 1044    Chlamydia (discrete)  Negative  23 1044    RPR  Non-Reactive  23 1044     VDRL       Syphilis Antibody       HBsAg  Non-Reactive  23 1044    Herpes Simplex Virus PCR       Herpes Simplex VIrus Culture       HIV  Non-Reactive  23 1044    Hep C RNA Quant PCR       Hep C Antibody  Non-Reactive  23 1044    AFP       Group B Strep  Negative  23 1624    GBS Susceptibility to Clindamycin       GBS Susceptibility to Erythromycin       Fetal Fibronectin       Genetic Testing, Maternal Blood                 Drug Screening       Test Value Date Time    Urine Drug Screen       Amphetamine Screen  Negative  23 1044    Barbiturate Screen  Negative  23 1044    Benzodiazepine Screen  Negative  23 1044    Methadone Screen  Negative  23 1044    Phencyclidine Screen  Negative  23 1044    Opiates Screen  Negative  23 1044    THC Screen  Negative  23 1044    Cocaine Screen       Propoxyphene Screen  Negative  23 1044    Buprenorphine Screen  Negative  23 1044    Methamphetamine Screen       Oxycodone Screen  Negative  23 1044    Tricyclic Antidepressants Screen  Negative  23 1044              Legend    ^: Historical                        PRENATAL RISK FACTORS  Pregnancy Problems (from 23 to present)       Problem Noted Resolved    Normal pregnancy in multigravida in third trimester 2023 by Suly Solis MD No    Surrogate pregnancy in third trimester 2023 by Sana Rea APRN No    Overview Addendum 2023 11:03 AM by Sana Rea APRN     Gestational carrier  Parents of fetus live in [place]. Parents will be arriving to [place] .  Baby Boy  Final BARRERA of 2023 by 1TUS              OB HISTORY  OB History    Para Term  AB Living   3 1 1   1 1   SAB IAB Ectopic Molar Multiple Live Births     1       1      # Outcome Date GA Lbr Conrado/2nd Weight Sex Delivery Anes PTL Lv   3 Current            2 Term 20 39w1d  3515 g (7 lb 12 oz)  Vag-Spont EPI  KAILA    1 IAB  25w0d          PAST MEDICAL HISTORY  History reviewed. No pertinent past medical history.    PAST SURGICAL HISTORY  History reviewed. No pertinent surgical history.    FAMILY HISTORY  History reviewed. No pertinent family history.    SOCIAL HISTORY  Social History     Socioeconomic History    Marital status:    Tobacco Use    Smoking status: Never    Smokeless tobacco: Never   Vaping Use    Vaping Use: Never used   Substance and Sexual Activity    Alcohol use: Never    Drug use: Never    Sexual activity: Yes     Partners: Male   ALLERGIES  Allergies   Allergen Reactions    Penicillins Hives    Amoxicillin Hives   HOME MEDICATIONS  Prior to Admission medications    Medication Sig Start Date End Date Taking? Authorizing Provider   cetirizine (zyrTEC) 10 MG tablet Take 1 tablet by mouth Daily.   Yes Provider, MD Brett   fluticasone (FLONASE) 50 MCG/ACT nasal spray    Yes Provider, MD Brett   Prenatal Vit-Fe Fumarate-FA (PRENATAL PO) Prenatal   Daily   Yes Provider, MD Brett   PHYSICAL EXAM  /76   Wt 80.7 kg (178 lb)   BMI 30.55 kg/mý   General: No acute distress. Well developed, well nourished. Pleasant.  Heart: Regular rate and rhythm. No murmurs, rubs, or gallops  Lungs: Clear to auscultation bilaterally. No wheezes, rales, or rhonchi.  Abdomen: Soft, nontender to palpation, enlarged by gravid uterus.    SVE: 2-3/ 80/ -2/ soft/ mid     IMPRESSION  Shavonne Michael is a 27 y.o.  scheduled for EIOL at 39w6d.    PLAN  1.  IOL  - Admit: Labor and Delivery  - Attending: Dr. Solis  - Condition: Stable  - Vitals: per protocol  - Activity: ad yvonne  - Nursing: Continuous electronic fetal monitoring, as per protocol  - Diet: Clears  - IV fluids:  mL/hr  - Meds: Pitocin  - Allergies: see list  - Labs: CBC, T&S, UDS  - GBS: negative.  Antibiotics: N/A  - Shavonne Michael and I have discussed pain goals for this hospitalization after reviewing her current clinical  condition, medical history and prior pain experiences.  The goal is to keep her pain level appropriate.  Patient may have epidural if desired.  - Anticipate     This document has been electronically signed by Suly Solis MD on August 15, 2023 12:32 CDT.    Electronically signed by Suly Solis MD at 08/15/23 1234                 Suly Solis MD at 08/15/23 1100          [facility]  HISTORY & PHYSICAL - Obstetrics    Name: Shavonne Michael  MRN: [id number]  Location: Room/bed info not found  Date: 8/15/2023   CSN: [id number]      CHIEF COMPLAINT: EIOL    HISTORY OF PRESENT ILLNESS  Shavonne Michael is a 27 y.o.  at 39w2d who presents today for RPN.  She is scheduled for EIOL at 39w6d.  Today, denies LOF, vaginal bleeding, or contractions.  Reports good FM.    Patient denies any chest pain, palpitations, headaches, lightheadedness, shortness of breath, cough, nausea, vomiting, diarrhea, constipation, fever, or chills.    ROS  Review of Systems   Constitutional: Negative.    HENT: Negative.     Eyes: Negative.    Respiratory: Negative.     Cardiovascular: Negative.    Gastrointestinal: Negative.    Endocrine: Negative.    Genitourinary: Negative.    Musculoskeletal: Negative.    Skin: Negative.    Allergic/Immunologic: Negative.    Neurological: Negative.    Hematological: Negative.    Psychiatric/Behavioral: Negative.       PRENATAL LAB RESULTS  Prenatal labs reviewed  External Prenatal Results       Pregnancy Outside Results - Transcribed From Office Records - See Scanned Records For Details       Test Value Date Time    ABO  O  23 1044    Rh  Positive  23 1044    Antibody Screen  Negative  23 1044    Varicella IgG       Rubella  1.31 index 23 1044    Hgb  11.9 g/dL 23 0956       13.7 g/dL 23 1044    Hct  35.4 % 23 0956       40.9 % 23 1044    Glucose Fasting GTT  80 mg/dL 23 0900     Glucose Tolerance Test 1 hour  160 mg/dL 23 0959    Glucose Tolerance Test 3 hour  106 mg/dL 23 1202    Gonorrhea (discrete)  Negative  23 1044    Chlamydia (discrete)  Negative  23 1044    RPR  Non-Reactive  23 1044    VDRL       Syphilis Antibody       HBsAg  Non-Reactive  23 1044    Herpes Simplex Virus PCR       Herpes Simplex VIrus Culture       HIV  Non-Reactive  23 1044    Hep C RNA Quant PCR       Hep C Antibody  Non-Reactive  23 1044    AFP       Group B Strep  Negative  23 1624    GBS Susceptibility to Clindamycin       GBS Susceptibility to Erythromycin       Fetal Fibronectin       Genetic Testing, Maternal Blood                 Drug Screening       Test Value Date Time    Urine Drug Screen       Amphetamine Screen  Negative  23 1044    Barbiturate Screen  Negative  23 1044    Benzodiazepine Screen  Negative  23 1044    Methadone Screen  Negative  23 1044    Phencyclidine Screen  Negative  23 1044    Opiates Screen  Negative  23 1044    THC Screen  Negative  23 1044    Cocaine Screen       Propoxyphene Screen  Negative  23 1044    Buprenorphine Screen  Negative  23 1044    Methamphetamine Screen       Oxycodone Screen  Negative  23 1044    Tricyclic Antidepressants Screen  Negative  23 1044              Legend    ^: Historical                          PRENATAL RISK FACTORS  Pregnancy Problems (from 23 to present)       Problem Noted Resolved    Normal pregnancy in multigravida in third trimester 2023 by Suly Solis MD No    Surrogate pregnancy in third trimester 2023 by Sana Rea APRN No    Overview Addendum 2023 11:03 AM by Sana Rea APRN     Gestational carrier  Parents of fetus live in [place]. Parents will be arriving to [place] .  Baby Boy  Final BARRERA of 2023 by 1TUS                OB HISTORY  OB History     Para Term  AB Living   3 1 1   1 1   SAB IAB Ectopic Molar Multiple Live Births     1       1      # Outcome Date GA Lbr Conrado/2nd Weight Sex Delivery Anes PTL Lv   3 Current            2 Term 20 39w1d  3515 g (7 lb 12 oz)  Vag-Spont EPI  KAILA   1 IAB  25w0d            PAST MEDICAL HISTORY  History reviewed. No pertinent past medical history.    PAST SURGICAL HISTORY  History reviewed. No pertinent surgical history.    FAMILY HISTORY  History reviewed. No pertinent family history.    SOCIAL HISTORY  Social History     Socioeconomic History    Marital status:    Tobacco Use    Smoking status: Never    Smokeless tobacco: Never   Vaping Use    Vaping Use: Never used   Substance and Sexual Activity    Alcohol use: Never    Drug use: Never    Sexual activity: Yes     Partners: Male     ALLERGIES  Allergies   Allergen Reactions    Penicillins Hives    Amoxicillin Hives     HOME MEDICATIONS  Prior to Admission medications    Medication Sig Start Date End Date Taking? Authorizing Provider   cetirizine (zyrTEC) 10 MG tablet Take 1 tablet by mouth Daily.   Yes ProviderBrett MD   fluticasone (FLONASE) 50 MCG/ACT nasal spray    Yes ProviderBrett MD   Prenatal Vit-Fe Fumarate-FA (PRENATAL PO) Prenatal   Daily   Yes Provider, MD Brett     PHYSICAL EXAM  /76   Wt 80.7 kg (178 lb)   BMI 30.55 kg/mý   General: No acute distress. Well developed, well nourished. Pleasant.  Heart: Regular rate and rhythm. No murmurs, rubs, or gallops  Lungs: Clear to auscultation bilaterally. No wheezes, rales, or rhonchi.  Abdomen: Soft, nontender to palpation, enlarged by gravid uterus.    SVE: 2-3/ 80/ -2/ soft/ mid     IMPRESSION  Shavonne Michael is a 27 y.o.  scheduled for EIOL at 39w6d.    PLAN  1.  IOL  - Admit: Labor and Delivery  - Attending: Dr. Solis  - Condition: Stable  - Vitals: per protocol  - Activity: ad yvonne  - Nursing: Continuous electronic fetal monitoring, as per  "protocol  - Diet: Clears  - IV fluids:  mL/hr  - Meds: Pitocin  - Allergies: see list  - Labs: CBC, T&S, UDS  - GBS: negative.  Antibiotics: N/A  - Shavonne Michael and I have discussed pain goals for this hospitalization after reviewing her current clinical condition, medical history and prior pain experiences.  The goal is to keep her pain level appropriate.  Patient may have epidural if desired.  - Anticipate     This document has been electronically signed by Sluy Solis MD on August 15, 2023 12:32 CDT.    Electronically signed by Suly Solis MD at 08/15/23 1234       Emergency Department Notes    No notes of this type exist for this encounter.          Physician Progress Notes (last 72 hours)        Kenna Pedro MD at 23 0759       Attestation signed by Srinivasa Mcgraw MD at 23 0811    I saw and evaluated the patient.  I agree with the findings and the plan of care as documented in the resident's note          This document has been electronically signed by Srinivasa Mcgraw MD on 2023 08:11 CDT  I have reviewed this documentation and agree.                  [facility]  Progress Note - Obstetrics    Name: Shavonne Michael  MRN: [id number]  [place] [facility]  Date: 2023  CSN: [id number]       PPD #1 s/p  at 39w6d secondary to EIOL. According to patient, she had some extra bleeding after delivery with her last pregnancy. Rectal Cytotec given prophylactically      Patient seen and examined.  Denies headache, dizziness, chest pain, shortness of breath, nausea, vomiting.  Minimal/moderate lochia.  OOB and ambulating.  Tolerating regular diet. Has passed gas. Has had a bowel movement. Voiding without difficulty. Surrogate.     PHYSICAL EXAM  /56 (BP Location: Left arm, Patient Position: Lying)   Pulse 67   Temp 97.8 øF (36.6 øC) (Oral)   Resp 16   Ht 162.6 cm (64\")   Wt 80.7 kg (178 lb)   SpO2 97%   " "Breastfeeding No   BMI 30.55 kg/mý   General: AAOx3, no apparent distress.  Chest: no visible signs of respiratory distress    Intake/Output Summary (Last 24 hours) at 2023 0759  Last data filed at 2023 1833  Gross per 24 hour   Intake --   Output 298 ml   Net -298 ml       IMPRESSION  Shavonne Michael is a 27 y.o.  PPD #1 s/p  at 39w6d secondary to EIOL.  Doing well and recovering appropriately. Surrogate.     PLAN  1.  Postpartum s/p   - Continue routine postpartum care.  - Diet: Pregnancy  - DVT prophylaxis: continue to ambulate  - Contraception: doesn't know. Pt states \"I'm done having kids of my own, the surrogacy agency will put me on birth control\".  - Discharge to home today.  Follow-up in 2 weeks (telephone visit), 6 weeks.     [facility]  [address] [place] [address]  Office: [phone]  This document has been electronically signed by Kenna Pedro Jasmin, MD on 2023 08:01 CDT        Electronically signed by Srinivasa Mcgraw MD at 23 0811       Medical Student Notes (last 72 hours)  Notes from 23 1025 through 23 1025   No notes of this type exist for this encounter.       Consult Notes (last 72 hours)  Notes from 23 1025 through 23 1025   No notes of this type exist for this encounter.          Discharge Summary        Srinivasa Mcgraw MD at 23 0754          [facility]  Shavonne Michael  : 1995  MRN: [id number]  CSN: [id number]    Discharge Summary    Date of Admission: 2023  Date of Discharge 2023  Principle Discharge Dx:     Type of Vaginal Delivery:  Spontaneous Vaginal Delivery    Brief History: The patient is a 27 y.o., now , who presented to labor and delivery for elective induction of labor    Hospital Course: The patient was admitted for induction of labor.  This was a surrogate pregnancy. Her labor was " unremarkable . She underwent a  Spontaneous Vaginal Delivery of a viable male infant at , weighing 3600 g, with Apgars of 8 and 9. She had no lacerations. Her post-partum course has been unremarkable. She is discharged home to her own care.  Discharge instructions were given.  Activity as tolerated  Diet as tolerated    Discharge Medications:      Your medication list        START taking these medications        Instructions Last Dose Given Next Dose Due   acetaminophen 325 MG tablet  Commonly known as: TYLENOL      Take 2 tablets by mouth Every 6 (Six) Hours.       ibuprofen 600 MG tablet  Commonly known as: ADVIL,MOTRIN      Take 1 tablet by mouth Every 6 (Six) Hours.              CONTINUE taking these medications        Instructions Last Dose Given Next Dose Due   cetirizine 10 MG tablet  Commonly known as: zyrTEC      Take 1 tablet by mouth Daily.       fluticasone 50 MCG/ACT nasal spray  Commonly known as: FLONASE                  STOP taking these medications      PRENATAL PO                  Where to Get Your Medications        These medications were sent to [id number] - [place], [place] - [address] - [phone]  - [phone]   [address] [place] [id number]      Phone: [phone]   acetaminophen 325 MG tablet  ibuprofen 600 MG tablet       Discharge Disposition: Home  Follow up: No future appointments.  Condition on discharge: stable            This document has been electronically signed by Srinivasa Mcgraw MD on August 20, 2023 07:55 CDT          Electronically signed by Srinivasa Mcgraw MD at 08/20/23 0758       Discharge Order (From admission, onward)       Start     Ordered    08/20/23 0759  Discharge patient  Once        Expected Discharge Date: 08/20/23   Expected Discharge Time: Midday   Discharge Disposition: Home or Self Care   Physician of Record for Attribution - Please select from Treatment  Team: PAT MUSA [[id number]]   Review needed by CMO to determine Physician of Record: No      Question Answer Comment   Physician of Record for Attribution - Please select from Treatment Team PAT MUSA    Review needed by CMO to determine Physician of Record No        08/20/23 0758

## 2023-09-05 ENCOUNTER — CLINICAL SUPPORT (OUTPATIENT)
Dept: OBSTETRICS AND GYNECOLOGY | Facility: CLINIC | Age: 28
End: 2023-09-05
Payer: COMMERCIAL

## 2023-09-05 PROBLEM — Z34.90 ENCOUNTER FOR ELECTIVE INDUCTION OF LABOR: Status: RESOLVED | Noted: 2023-08-19 | Resolved: 2023-09-05

## 2023-09-05 PROCEDURE — 0503F POSTPARTUM CARE VISIT: CPT | Performed by: OBSTETRICS & GYNECOLOGY

## 2023-09-05 NOTE — PROGRESS NOTES
[facility]  Obstetrics  Date of Service: 2023    CC: Postpartum visit      Shavonne Michael is a 28 y.o.  s/p Vaginal, Spontaneous on 2023 at 39w6d secondary to EIOL who presents today for postpartum check.  She was a surrogate during this pregnancy; the baby was adopted to a [place] couple.  The patient states she is feeling fantastic.  Patient denies postpartum depression.  Menstrual cycles have not resumed.  No breast issues.  She has not resumed sexual intercourse.  Denies bowel or bladder issues.    PHYSICAL EXAM:    Breastfeeding No   Postpartum Depression Screening Questionnaire: 0    IMPRESSION/PLAN: Shavonne Michael is a 28 y.o.  s/p Vaginal, Spontaneous on .  Doing well.  - Recovered nicely from her delivery  - Contraception: Declined, considering another round of surrogacy  - Continue restrictions  - RTC 4 weeks for final PP visit w/ pap smear    This document has been electronically signed by Suly Solis MD on 2023 11:16 CDT.    This visit has been rescheduled as a phone visit to comply with patient safety concerns in accordance with CDC recommendations.  Total time of discussion was 8 minutes.  The use of a telephone visit has been reviewed with the patient and verbal informed consent has been obtained.